# Patient Record
Sex: MALE | Race: AMERICAN INDIAN OR ALASKA NATIVE
[De-identification: names, ages, dates, MRNs, and addresses within clinical notes are randomized per-mention and may not be internally consistent; named-entity substitution may affect disease eponyms.]

---

## 2019-03-25 ENCOUNTER — HOSPITAL ENCOUNTER (INPATIENT)
Dept: HOSPITAL 31 - C.ER | Age: 64
LOS: 1 days | DRG: 296 | End: 2019-03-26
Attending: INTERNAL MEDICINE | Admitting: INTERNAL MEDICINE
Payer: MEDICARE

## 2019-03-25 VITALS — BODY MASS INDEX: 30.4 KG/M2

## 2019-03-25 DIAGNOSIS — Z87.891: ICD-10-CM

## 2019-03-25 DIAGNOSIS — E11.21: ICD-10-CM

## 2019-03-25 DIAGNOSIS — Z51.5: ICD-10-CM

## 2019-03-25 DIAGNOSIS — D69.6: ICD-10-CM

## 2019-03-25 DIAGNOSIS — Z66: ICD-10-CM

## 2019-03-25 DIAGNOSIS — I13.11: ICD-10-CM

## 2019-03-25 DIAGNOSIS — E87.4: ICD-10-CM

## 2019-03-25 DIAGNOSIS — I49.01: ICD-10-CM

## 2019-03-25 DIAGNOSIS — E11.22: ICD-10-CM

## 2019-03-25 DIAGNOSIS — I46.9: Primary | ICD-10-CM

## 2019-03-25 DIAGNOSIS — J96.90: ICD-10-CM

## 2019-03-25 DIAGNOSIS — Z95.5: ICD-10-CM

## 2019-03-25 DIAGNOSIS — F10.20: ICD-10-CM

## 2019-03-25 DIAGNOSIS — N18.6: ICD-10-CM

## 2019-03-25 DIAGNOSIS — I25.10: ICD-10-CM

## 2019-03-25 DIAGNOSIS — D63.8: ICD-10-CM

## 2019-03-25 DIAGNOSIS — Z21: ICD-10-CM

## 2019-03-25 DIAGNOSIS — Z99.2: ICD-10-CM

## 2019-03-25 LAB
ALBUMIN SERPL-MCNC: 3.9 {NULL, G/DL} (ref 3.5–5)
ALBUMIN SERPL-MCNC: 4.5 {NULL, G/DL} (ref 3.5–5)
ALBUMIN/GLOB SERPL: 1.5 {NULL, NULL} (ref 1–2.1)
ALBUMIN/GLOB SERPL: 1.6 {NULL, NULL} (ref 1–2.1)
ALT SERPL-CCNC: 335 {NULL, U/L} (ref 21–72)
ALT SERPL-CCNC: 446 {NULL, U/L} (ref 21–72)
ANISOCYTOSIS BLD QL SMEAR: SLIGHT {NULL, NULL}
APTT BLD: 40 {NULL, SECONDS} (ref 21–34)
APTT BLD: 45 {NULL, SECONDS} (ref 21–34)
ARTERIAL BLOOD GAS O2 SAT: 100.6 {NULL, %} (ref 95–98)
ARTERIAL BLOOD GAS O2 SAT: 99.5 {NULL, %} (ref 95–98)
ARTERIAL BLOOD GAS PCO2: 23 {NULL, MM/HG} (ref 35–45)
ARTERIAL BLOOD GAS PCO2: 43 {NULL, MM/HG} (ref 35–45)
ARTERIAL BLOOD GAS TCO2: 17 {NULL, MMOL/L} (ref 22–28)
ARTERIAL BLOOD GAS TCO2: 6.8 {NULL, MMOL/L} (ref 22–28)
ARTERIAL PATENCY WRIST A: (no result) {NULL, NULL}
AST SERPL-CCNC: 266 {NULL, U/L} (ref 17–59)
AST SERPL-CCNC: 527 {NULL, U/L} (ref 17–59)
BACTERIA #/AREA URNS HPF: (no result) {NULL, NULL}
BASOPHILS # BLD AUTO: 0 {NULL, K/UL} (ref 0–0.2)
BASOPHILS # BLD AUTO: 0 {NULL, K/UL} (ref 0–0.2)
BASOPHILS # BLD AUTO: 0.1 {NULL, K/UL} (ref 0–0.2)
BASOPHILS NFR BLD: 0.1 {NULL, %} (ref 0–2)
BASOPHILS NFR BLD: 0.3 {NULL, %} (ref 0–2)
BASOPHILS NFR BLD: 0.4 {NULL, %} (ref 0–2)
BILIRUB UR-MCNC: NEGATIVE {NULL, NULL}
BUN SERPL-MCNC: 13 {NULL, MG/DL} (ref 9–20)
BUN SERPL-MCNC: 18 {NULL, MG/DL} (ref 9–20)
BUN SERPL-MCNC: 21 {NULL, MG/DL} (ref 9–20)
CALCIUM SERPL-MCNC: 9.1 {NULL, MG/DL} (ref 8.6–10.4)
CALCIUM SERPL-MCNC: 9.4 {NULL, MG/DL} (ref 8.6–10.4)
CALCIUM SERPL-MCNC: 9.6 {NULL, MG/DL} (ref 8.6–10.4)
CK MB SERPL-MCNC: 103 {NULL, NG/ML} (ref 0–3.38)
CK MB SERPL-MCNC: 168 {NULL, NG/ML} (ref 0–3.38)
CK MB SERPL-MCNC: 5.15 {NULL, NG/ML} (ref 0–3.38)
EOSINOPHIL # BLD AUTO: 0 {NULL, K/UL} (ref 0–0.7)
EOSINOPHIL # BLD AUTO: 0 {NULL, K/UL} (ref 0–0.7)
EOSINOPHIL # BLD AUTO: 0.4 {NULL, K/UL} (ref 0–0.7)
EOSINOPHIL NFR BLD: 0.1 {NULL, %} (ref 0–4)
EOSINOPHIL NFR BLD: 0.2 {NULL, %} (ref 0–4)
EOSINOPHIL NFR BLD: 1.5 {NULL, %} (ref 0–4)
ERYTHROCYTE [DISTWIDTH] IN BLOOD BY AUTOMATED COUNT: 16.7 {NULL, %} (ref 11.5–14.5)
ERYTHROCYTE [DISTWIDTH] IN BLOOD BY AUTOMATED COUNT: 17 {NULL, %} (ref 11.5–14.5)
ERYTHROCYTE [DISTWIDTH] IN BLOOD BY AUTOMATED COUNT: 17 {NULL, %} (ref 11.5–14.5)
GFR NON-AFRICAN AMERICAN: 6 {NULL, NULL}
GFR NON-AFRICAN AMERICAN: 7 {NULL, NULL}
GFR NON-AFRICAN AMERICAN: 9 {NULL, NULL}
GLUCOSE UR STRIP-MCNC: NORMAL {NULL, MG/DL}
HCO3 BLDA-SCNC: 15.3 {NULL, MMOL/L} (ref 21–28)
HCO3 BLDA-SCNC: 6.8 {NULL, MMOL/L} (ref 21–28)
HGB BLD-MCNC: 10.1 {NULL, G/DL} (ref 12–18)
HGB BLD-MCNC: 9.5 {NULL, G/DL} (ref 12–18)
HGB BLD-MCNC: 9.5 {NULL, G/DL} (ref 12–18)
HYPOCHROMIC: SLIGHT {NULL, NULL}
INHALED O2 CONCENTRATION: 100 {NULL, %}
INHALED O2 CONCENTRATION: 100 {NULL, %}
INR PPP: 1.1 {NULL, NULL}
INR PPP: 1.3 {NULL, NULL}
LEUKOCYTE ESTERASE UR-ACNC: (no result) {NULL, LEU/UL}
LYMPHOCYTE: 7 {NULL, %} (ref 20–40)
LYMPHOCYTES # BLD AUTO: 0.8 {NULL, K/UL} (ref 1–4.3)
LYMPHOCYTES # BLD AUTO: 1.1 {NULL, K/UL} (ref 1–4.3)
LYMPHOCYTES # BLD AUTO: 10.1 {NULL, K/UL} (ref 1–4.3)
LYMPHOCYTES NFR BLD AUTO: 3.8 {NULL, %} (ref 20–40)
LYMPHOCYTES NFR BLD AUTO: 43.3 {NULL, %} (ref 20–40)
LYMPHOCYTES NFR BLD AUTO: 7.4 {NULL, %} (ref 20–40)
MCH RBC QN AUTO: 27.5 {NULL, PG} (ref 27–31)
MCH RBC QN AUTO: 28.1 {NULL, PG} (ref 27–31)
MCH RBC QN AUTO: 28.2 {NULL, PG} (ref 27–31)
MCHC RBC AUTO-ENTMCNC: 29.4 {NULL, G/DL} (ref 33–37)
MCHC RBC AUTO-ENTMCNC: 30.6 {NULL, G/DL} (ref 33–37)
MCHC RBC AUTO-ENTMCNC: 31.5 {NULL, G/DL} (ref 33–37)
MCV RBC AUTO: 89.3 {NULL, FL} (ref 80–94)
MCV RBC AUTO: 92.4 {NULL, FL} (ref 80–94)
MCV RBC AUTO: 93.8 {NULL, FL} (ref 80–94)
MONOCYTE: 3 {NULL, %} (ref 0–10)
MONOCYTES # BLD: 1 {NULL, K/UL} (ref 0–0.8)
MONOCYTES # BLD: 1.1 {NULL, K/UL} (ref 0–0.8)
MONOCYTES # BLD: 1.2 {NULL, K/UL} (ref 0–0.8)
MONOCYTES NFR BLD: 4.8 {NULL, %} (ref 0–10)
MONOCYTES NFR BLD: 4.9 {NULL, %} (ref 0–10)
MONOCYTES NFR BLD: 7.7 {NULL, %} (ref 0–10)
NEUTROPHILS # BLD: 11.6 {NULL, K/UL} (ref 1.8–7)
NEUTROPHILS # BLD: 12.9 {NULL, K/UL} (ref 1.8–7)
NEUTROPHILS # BLD: 18.9 {NULL, K/UL} (ref 1.8–7)
NEUTROPHILS NFR BLD AUTO: 49.9 {NULL, %} (ref 50–75)
NEUTROPHILS NFR BLD AUTO: 84.4 {NULL, %} (ref 50–75)
NEUTROPHILS NFR BLD AUTO: 88 {NULL, %} (ref 50–75)
NEUTROPHILS NFR BLD AUTO: 91.2 {NULL, %} (ref 50–75)
NEUTS BAND NFR BLD: 2 {NULL, %} (ref 0–2)
NRBC BLD AUTO-RTO: 0.3 {NULL, %} (ref 0–2)
OVALOCYTES BLD QL SMEAR: SLIGHT {NULL, NULL}
PH BLDA: 7.03 {NULL, NULL} (ref 7.35–7.45)
PH BLDA: 7.17 {NULL, NULL} (ref 7.35–7.45)
PH UR STRIP: 5 {NULL, NULL} (ref 5–8)
PLATELET # BLD EST: NORMAL {NULL, NULL}
PLATELET # BLD: 178 {NULL, K/UL} (ref 130–400)
PLATELET # BLD: 181 {NULL, K/UL} (ref 130–400)
PLATELET # BLD: 99 {NULL, K/UL} (ref 130–400)
PMV BLD AUTO: 8.3 {NULL, FL} (ref 7.2–11.7)
PMV BLD AUTO: 8.8 {NULL, FL} (ref 7.2–11.7)
PMV BLD AUTO: 9.2 {NULL, FL} (ref 7.2–11.7)
PO2 BLDA: 267 {NULL, MM/HG} (ref 80–100)
PO2 BLDA: 285 {NULL, MM/HG} (ref 80–100)
POLYCHROMIC: SLIGHT {NULL, NULL}
PROT UR STRIP-MCNC: (no result) {NULL, MG/DL}
PROTHROMBIN TIME: 12.5 {NULL, SECONDS} (ref 9.7–12.2)
PROTHROMBIN TIME: 13.7 {NULL, SECONDS} (ref 9.7–12.2)
RBC # BLD AUTO: 3.36 {NULL, MIL/UL} (ref 4.4–5.9)
RBC # BLD AUTO: 3.46 {NULL, MIL/UL} (ref 4.4–5.9)
RBC # BLD AUTO: 3.59 {NULL, MIL/UL} (ref 4.4–5.9)
RBC # UR STRIP: NEGATIVE {NULL, NULL}
SP GR UR STRIP: 1.02 {NULL, NULL} (ref 1–1.03)
SQUAMOUS EPITHIAL: < 1 {NULL, /HPF} (ref 0–5)
TOTAL CELLS COUNTED BLD: 100 {NULL, NULL}
TROPONIN I SERPL-MCNC: 0.22 {NULL, NG/ML} (ref 0–0.12)
TROPONIN I SERPL-MCNC: 107 {NULL, NG/ML} (ref 0–0.12)
TROPONIN I SERPL-MCNC: 61.5 {NULL, NG/ML} (ref 0–0.12)
UROBILINOGEN UR-MCNC: NORMAL {NULL, MG/DL} (ref 0.2–1)
WBC # BLD AUTO: 15.2 {NULL, K/UL} (ref 4.8–10.8)
WBC # BLD AUTO: 20.8 {NULL, K/UL} (ref 4.8–10.8)
WBC # BLD AUTO: 23.3 {NULL, K/UL} (ref 4.8–10.8)

## 2019-03-25 PROCEDURE — 5A1945Z RESPIRATORY VENTILATION, 24-96 CONSECUTIVE HOURS: ICD-10-PCS | Performed by: INTERNAL MEDICINE

## 2019-03-25 PROCEDURE — 0BH17EZ INSERTION OF ENDOTRACHEAL AIRWAY INTO TRACHEA, VIA NATURAL OR ARTIFICIAL OPENING: ICD-10-PCS | Performed by: INTERNAL MEDICINE

## 2019-03-25 PROCEDURE — 5A12012 PERFORMANCE OF CARDIAC OUTPUT, SINGLE, MANUAL: ICD-10-PCS | Performed by: INTERNAL MEDICINE

## 2019-03-25 RX ADMIN — IPRATROPIUM BROMIDE AND ALBUTEROL SULFATE SCH ML: .5; 3 SOLUTION RESPIRATORY (INHALATION) at 19:10

## 2019-03-25 RX ADMIN — DOPAMINE HYDROCHLORIDE IN DEXTROSE PRN MLS/HR: 1.6 INJECTION, SOLUTION INTRAVENOUS at 22:16

## 2019-03-25 RX ADMIN — IPRATROPIUM BROMIDE AND ALBUTEROL SULFATE SCH ML: .5; 3 SOLUTION RESPIRATORY (INHALATION) at 15:40

## 2019-03-25 RX ADMIN — HUMAN INSULIN SCH UNIT: 100 INJECTION, SOLUTION SUBCUTANEOUS at 23:23

## 2019-03-25 RX ADMIN — HUMAN INSULIN SCH UNIT: 100 INJECTION, SOLUTION SUBCUTANEOUS at 22:25

## 2019-03-25 RX ADMIN — DOPAMINE HYDROCHLORIDE IN DEXTROSE PRN MLS/HR: 1.6 INJECTION, SOLUTION INTRAVENOUS at 16:59

## 2019-03-25 NOTE — C.PDOC
History Of Present Illness





Patient BIBA from dialysis s/p cardiac arrest (completed 3 hrs).  As per EMS, 

patient was eating a protein bar and then arrested (? choking episode).  He was 

given epi x6 + CaCl in the field, and shocked once (ventricular fibrillation).  

Arrives to ED intubated, with CPR in progress.


Time Seen by Provider: 03/25/19 10:21


Chief Complaint (Nursing): Cardiac Arrest


History Per: EMS


Reason For Code Blue: Full Arrest


Circumstances: Brought To ED By EMS


Arrest Witnessed By: Nurse


Down-Time Before ACLS: Mins (60 minutes)


Treatment Initiated Prior To MD Arrival: Yes: CPR, BVM Ventilations, Intubation,

Defibrillation, IVF, ACLS Medication Initiation, IV Access





- Initial Findings


Mentation: Unresponsive


Respirations: None (Assisted)





Past Medical History


Reviewed: Historical Data, Nursing Documentation, Vital Signs





- Medical History


PMH: No Chronic Diseases


Family History: States: No Known Family Hx





Review Of Systems


Review Of Systems: ROS cannot be obtained secondary to pt's inabilty to answer 

questions.





Physical Exam





- Physical Exam


Appears: Other (unresponsive )


Head: Atraumatic, Normacephalic


Eye(s): bilateral: Other (fixed  B/L )


Oral Mucosa: Moist


Cardiovascular: Other (pulseless)


Respiratory: Other (assisted ventilations, equal breath sounds B/L )


Gastrointestinal/Abdominal: Normal Exam, Bowel Sounds, Soft, No Tenderness, 

Other (obese)





ED Course And Treatment





- Laboratory Results


Result Diagrams: 


                                 03/25/19 11:04





                                 03/25/19 11:04


O2 Sat by Pulse Oximetry: 100 (ambu)


Pulse Ox Interpretation: Normal


Progress Note: Epi x1 given, patientregained pulse at next pulse check - atrial 

fibrillation on EKG.  Approx ten minutes later, patient become bradycardic and 

then lost pulse again.  Epi x1 given and patient regained pulse.  IO right tibia

inserted by me, (+) good flushing.  Blood drawn from right brachial artery by 

me.  Patient lost pulse again - epi x1 and CPR started.  At next rhythm check 

(+) good pulse with organized rhythm.  Spoke with intensivist Dr. Clark, 

will come down and see patient.  Patient's  in ED, made aware of jennifer lenz's condition and multiple arrests.  He wants everything done for patient.





Central Line Placement





- Central Line Placement


Indication: Emergent IV Access


Central Line Placement: Left: Femoral


The Area Was Thoroughly Prepared With: Chlorhexidine


Procedure: Triple Lumen, Placed Using Standard Seldinger Technique, Catheter Was

Sewn Into Place, Sterile Dressing Placed Over Line, Procedure Tolerated Well





Disposition





- Disposition


Forms:  [facility] (English)

## 2019-03-25 NOTE — CP.PCM.PN
Subjective





- Date & Time of Evaluation


Date of Evaluation: 03/25/19


Time of Evaluation: 20:10





- Subjective


Subjective: 


House doctor Code Blue note





Objective





- Vital Signs/Intake and Output


Vital Signs (last 24 hours): 


                                        











Temp Pulse Resp BP Pulse Ox


 


 92 F L  85   14   134/88   100 


 


 03/25/19 21:00  03/25/19 21:04  03/25/19 21:04  03/25/19 21:22  03/25/19 21:04








Intake and Output: 


                                        











 03/25/19 03/26/19





 18:59 06:59


 


Intake Total 1177.8 644


 


Output Total 106 


 


Balance 1071.8 644














- Medications


Medications: 


                               Current Medications





Albuterol/Ipratropium (Duoneb 3 Mg/0.5 Mg (3 Ml) Ud)  3 ml INH RQ4 TANIKA


   Last Admin: 03/25/19 19:10 Dose:  3 ml


Calcium Acetate (Phoslo)  667 mg PO DAILY [facility]


Clopidogrel Bisulfate (Plavix)  75 mg PO DAILY [facility]


Ezetimibe (Zetia)  10 mg PO HS [facility]


Famotidine (Pepcid)  20 mg PO DAILY [facility]


Gabapentin (Neurontin)  100 mg PO HS [facility]


Heparin Sodium (Porcine) (Heparin)  5,000 units SC Q8 [facility]


Home Med (Patient's Own Medication)  1 tab PO DAILY [facility]


Norepinephrine Bitartrate 4 mg (/ Sodium Chloride)  254 mls @ 15.24 mls/hr IV 

[id number] PRN; Protocol


   PRN Reason: TITRATE PER MD ORDER


   Last Admin: 03/25/19 21:22 Dose:  15 mcg/min, 57.15 mls/hr


Dopamine HCl/Dextrose (Dopamine 400mg/250ml D5w)  400 mg in 250 mls @ 7.032 

mls/hr IV .Q24H PRN; Protocol


   PRN Reason: TITRATE PER MD ORDER


   Last Titration: 03/25/19 21:12 Dose:  15 mcg/kg/min, 52.739 mls/hr


Meperidine HCl (Demerol)  25 mg IVP Q30M PRN


   PRN Reason: Rigors


Potassium Chloride (Potassium Chloride Oral Soln)  20 meq PO DAILY [facility]


Raltegravir (Isentress)  400 mg PO Q12H [facility]; Protocol











- Labs


Labs: 


                                        





                                 03/25/19 14:44 





                                 03/25/19 14:44 





                                        











PT  12.5 SECONDS (9.7-12.2)  H  03/25/19  11:04    


 


INR  1.1   03/25/19  11:04    


 


APTT  45 SECONDS (21-34)  H  03/25/19  11:04

## 2019-03-25 NOTE — RAD
Date of service: 



03/25/2019



PROCEDURE:  CHEST RADIOGRAPH, 1 VIEW



HISTORY:

chest pain



COMPARISON:

None available.



FINDINGS:

Endotracheal tube terminates 2.3 cm proximal to the wesley.  The 

nasogastric tube terminates in the stomach. 



LUNGS:





The lungs are well inflated.  There is mild pulmonary venous 

congestion with redistribution 



PLEURA:

No pneumothorax or pleural effusion.



CARDIOVASCULAR:

Severe cardiomegaly.  There are aortic atherosclerotic calcifications 

present. 



OSSEOUS STRUCTURES:

Within normal limits for the patient's age.



VISUALIZED UPPER ABDOMEN:

Normal.



OTHER FINDINGS:

None. 



IMPRESSION:

Endotracheal tube terminates 2.3 cm proximal to the wesley.  

Nasogastric tube terminates in the stomach.



Severe cardiomegaly and mild pulmonary venous congestion with 

redistribution.

## 2019-03-25 NOTE — CP.CCUPN
CCU Subjective





- Physician Review


Events Since Last Encounter (Free Text): 





03/25/19 20:46


patient became bradycardic atropine given,then pulseless


CPR performed,one dose epinephrine and bicarbonate given.Regained pulse 


03/25/19 20:48





03/25/19 20:48








CCU Objective





- Vital Signs / Intake & Output


Vital Signs (Last 4 hours): 


Vital Signs











  Temp Pulse Resp BP Pulse Ox


 


 03/25/19 20:14   84  18  87/62 L 


 


 03/25/19 20:00   48 L  20   99


 


 03/25/19 19:34   71  20  119/82  54 L


 


 03/25/19 19:17   67   


 


 03/25/19 19:03   69  14  100/64  100


 


 03/25/19 19:00  91.8 F L  70  15  93/66 L  100


 


 03/25/19 18:33   68  13  81/60 L  100


 


 03/25/19 18:30  92.8 F L  68  24  


 


 03/25/19 18:21  94.6 F L  67  14  86/57 L  100


 


 03/25/19 18:10     95/62 L 


 


 03/25/19 18:04   67  15  95/62 L  100


 


 03/25/19 18:00  94.6 F L  68  24  95/62 L 


 


 03/25/19 17:34   73  15  106/70  88 L


 


 03/25/19 17:30  94.6 F L  71  16  106/70 


 


 03/25/19 17:04   107 H  14  150/87  100


 


 03/25/19 17:00  96.4 F L  108 H  13  150/87 


 


 03/25/19 16:59     47/32 L 


 


 03/25/19 16:56   142 H  17  107/74  95


 


 03/25/19 16:53   69  20  52/28 L  100











Intake and Output (Last 8hrs): 


                                 Intake & Output











 03/25/19 03/25/19 03/25/19





 06:59 14:59 22:59


 


Intake Total  235 1279.8


 


Output Total   106


 


Balance  235 1173.8


 


Weight  206 lb 11.2 oz 


 


Intake:   


 


  IV  235 236


 


  Intake, IV Amount   1043.8


 


    Left Distal Port Femoral   50


 


    Left Medial Port Femoral   191.3


 


    Left Proximal Port   802.5





    Femoral   


 


Output:   


 


  Urine   106


 


    Urethral (Handy)   51


 


Other:   


 


  Voiding Method  Indwelling Catheter 














- Physical Exam


Physical Exam Limitations: Positive for: Other (orally intubated,unresponsive to

pain)


Head: Positive for: Atraumatic, Normocephalic


Pupils: Positive for: Non-Reactive


Mouth: Positive for: Moist Mucous Membranes


Respiratory/Chest: Positive for: Clear to Auscultation


Cardiovascular: Positive for: Regular Rate and Rhythm





- Medications


Active Medications: 


Active Medications











Generic Name Dose Route Start Last Admin





  Trade Name Freq  PRN Reason Stop Dose Admin


 


Albuterol/Ipratropium  3 ml  03/25/19 16:00  03/25/19 19:10





  Duoneb 3 Mg/0.5 Mg (3 Ml) Ud  INH   3 ml





  RQ4 [facility]   Administration





     





     





     





     


 


Calcium Acetate  667 mg  03/26/19 10:00  





  Phoslo  PO   





  DAILY [facility]   





     





     





     





     


 


Clopidogrel Bisulfate  75 mg  03/26/19 10:00  





  Plavix  PO   





  DAILY [facility]   





     





     





     





     


 


Ezetimibe  10 mg  03/25/19 22:00  





  Zetia  PO   





  HS [facility]   





     





     





     





     


 


Famotidine  20 mg  03/26/19 10:00  





  Pepcid  PO   





  DAILY [facility]   





     





     





     





     


 


Gabapentin  100 mg  03/25/19 22:00  





  Neurontin  PO   





  HS [facility]   





     





     





     





     


 


Heparin Sodium (Porcine)  5,000 units  03/25/19 22:00  





  Heparin  SC   





  Q8 [facility]   





     





     





     





     


 


Home Med  1 tab  03/26/19 10:00  





  Patient's Own Medication  PO   





  DAILY [facility]   





     





     





     





     


 


Norepinephrine Bitartrate 4 mg  254 mls @ 15.24 mls/hr  03/25/19 16:42  03/25/19

20:28





  / Sodium Chloride  IV   20 mcg/min





  .[id number] PRN   76.2 mls/hr





  TITRATE PER MD ORDER   Titration





     





  Protocol   





  4 MCG/MIN   


 


Dopamine HCl/Dextrose  400 mg in 250 mls @ 7.032 mls/hr  03/25/19 17:05  

03/25/19 20:27





  Dopamine 400mg/250ml D5w  IV   20 mcg/kg/min





  .Q24H PRN   70.319 mls/hr





  TITRATE PER MD ORDER   Titration





     





  Protocol   





  2 MCG/KG/MIN   


 


Meperidine HCl  25 mg  03/25/19 15:06  





  Demerol  IVP   





  Q30M PRN   





  [place]   





     





     





     


 


Potassium Chloride  20 meq  03/26/19 10:00  





  Potassium Chloride Oral Soln  PO   





  DAILY [facility]   





     





     





     





     


 


Raltegravir  400 mg  03/25/19 22:00  





  [place]  PO   





  Q12H [facility]   





     





     





  Protocol   





     














- Patient Studies


Lab Studies: 


                                   Lab Studies











  03/25/19 03/25/19 03/25/19 Range/Units





  19:30 16:40 14:44 


 


WBC     (4.8-10.8)  K/uL


 


RBC     (4.40-5.90)  Mil/uL


 


Hgb     (12.0-18.0)  g/dL


 


Hct     (35.0-51.0)  %


 


MCV     (80.0-94.0)  fL


 


MCH     (27.0-31.0)  pg


 


MCHC     (33.0-37.0)  g/dL


 


RDW     (11.5-14.5)  %


 


Plt Count     (130-400)  K/uL


 


MPV     (7.2-11.7)  fL


 


Neut % (Auto)     (50.0-75.0)  %


 


Lymph % (Auto)     (20.0-40.0)  %


 


Mono % (Auto)     (0.0-10.0)  %


 


Eos % (Auto)     (0.0-4.0)  %


 


Baso % (Auto)     (0.0-2.0)  %


 


Neut # (Auto)     (1.8-7.0)  K/uL


 


Lymph # (Auto)     (1.0-4.3)  K/uL


 


Mono # (Auto)     (0.0-0.8)  K/uL


 


Eos # (Auto)     (0.0-0.7)  K/uL


 


Baso # (Auto)     (0.0-0.2)  K/uL


 


Neutrophils % (Manual)     (50-75)  %


 


Band Neutrophils %     (0-2)  %


 


Lymphocytes % (Manual)     (20-40)  %


 


Monocytes % (Manual)     (0-10)  %


 


Differential Comment     


 


Platelet Estimate     (NORMAL)  


 


Polychromasia     


 


Hypochromasia (manual)     


 


Anisocytosis (manual)     


 


Ovalocytes     


 


PT     (9.7-12.2)  SECONDS


 


INR     


 


APTT     (21-34)  SECONDS


 


Puncture Site  Rba    


 


pCO2  43    (35-45)  mm/Hg


 


pO2  285 H    ()  mm/Hg


 


HCO3  15.3 L    (21-28)  mmol/L


 


ABG pH  7.17 L*    (7.35-7.45)  


 


ABG Total CO2  17.0 L    (22-28)  mmol/L


 


ABG O2 Saturation  99.5 H    (95-98)  %


 


ABG Base Excess  -12.4 L    (-2.0-3.0)  mmol/L


 


John Test  Pos    


 


ABG Potassium  3.7    (3.6-5.2)  mmol/L


 


A-a O2 Difference  374.0    mm/Hg


 


Respiratory Index  1.3    


 


Glucose  247 H    ()  mg/dl


 


Lactate  15.6 H*    (0.7-2.1)  mmol/L


 


Vent Mode  Prvc    


 


Mechanical Rate  20    


 


FiO2  100.0    %


 


Tidal Volume  500    


 


PEEP  5    


 


Crit Value Called To  Dr quan    


 


Crit Value Called By  [facility]    


 


Crit Value Read Back  Y    


 


Blood Gas Notified Time  1934    


 


Sodium  142.0    (132-148)  mmol/L


 


Potassium     (3.6-5.2)  mmol/L


 


Chloride  100.0    ()  mmol/L


 


Carbon Dioxide     (22-30)  mmol/L


 


Anion Gap     (10-20)  


 


BUN     (9-20)  mg/dL


 


Creatinine     (0.8-1.5)  mg/dL


 


Est GFR (African Amer)     


 


Est GFR (Non-Af Amer)     


 


Random Glucose     ()  mg/dL


 


Calcium     (8.6-10.4)  mg/dl


 


Phosphorus     (2.5-4.5)  mg/dL


 


Magnesium     (1.6-2.3)  mg/dL


 


Total Bilirubin     (0.2-1.3)  mg/dL


 


AST     (17-59)  U/L


 


ALT     (21-72)  U/L


 


Alkaline Phosphatase     ()  U/L


 


Total Creatine Kinase   3962 H   ()  U/L


 


CK-MB (Mass)   103 H   (0.0-3.38)  ng/mL


 


Troponin I   61.5000 H*   (0.00-0.120)  ng/mL


 


NT-Pro-B Natriuret Pep     (0-900)  pg/mL


 


Total Protein     (6.3-8.3)  g/dL


 


Albumin     (3.5-5.0)  g/dL


 


Globulin     (2.2-3.9)  gm/dL


 


Albumin/Globulin Ratio     (1.0-2.1)  


 


Arterial Blood Potassium  3.7    (3.6-5.2)  mmol/L


 


Urine Color    Yellow  (YELLOW)  


 


Urine Clarity    Hazy  (Clear)  


 


Urine pH    5.0  (5.0-8.0)  


 


Ur Specific Gravity    1.023  (1.003-1.030)  


 


Urine Protein    1+ H  (NEGATIVE)  mg/dL


 


Urine Glucose (UA)    Normal  (Normal)  mg/dL


 


Urine Ketones    Negative  (NEGATIVE)  mg/dL


 


Urine Blood    Negative  (NEGATIVE)  


 


Urine Nitrate    Negative  (NEGATIVE)  


 


Urine Bilirubin    Negative  (NEGATIVE)  


 


Urine Urobilinogen    Normal  (0.2-1.0)  mg/dL


 


Ur Leukocyte Esterase    Neg  (Negative)  Caesar/uL


 


Urine WBC (Auto)    2  (0-5)  /hpf


 


Urine RBC (Auto)    3  (0-3)  /hpf


 


Ur Squamous Epith Cells    < 1  (0-5)  /hpf


 


Urine Bacteria    Rare  (<OCC)  


 


Blood Type     


 


Antibody Screen     














  03/25/19 03/25/19 03/25/19 Range/Units





  14:44 14:44 11:50 


 


WBC   15.2 H   (4.8-10.8)  K/uL


 


RBC   3.59 L   (4.40-5.90)  Mil/uL


 


Hgb   10.1 L   (12.0-18.0)  g/dL


 


Hct   32.1 L   (35.0-51.0)  %


 


MCV   89.3  D   (80.0-94.0)  fL


 


MCH   28.1   (27.0-31.0)  pg


 


MCHC   31.5 L   (33.0-37.0)  g/dL


 


RDW   17.0 H   (11.5-14.5)  %


 


Plt Count   178   (130-400)  K/uL


 


MPV   8.8   (7.2-11.7)  fL


 


Neut % (Auto)   84.4 H   (50.0-75.0)  %


 


Lymph % (Auto)   7.4 L   (20.0-40.0)  %


 


Mono % (Auto)   7.7   (0.0-10.0)  %


 


Eos % (Auto)   0.2   (0.0-4.0)  %


 


Baso % (Auto)   0.3   (0.0-2.0)  %


 


Neut # (Auto)   12.9 H   (1.8-7.0)  K/uL


 


Lymph # (Auto)   1.1   (1.0-4.3)  K/uL


 


Mono # (Auto)   1.2 H   (0.0-0.8)  K/uL


 


Eos # (Auto)   0.0   (0.0-0.7)  K/uL


 


Baso # (Auto)   0.0   (0.0-0.2)  K/uL


 


Neutrophils % (Manual)   88 H   (50-75)  %


 


Band Neutrophils %   2   (0-2)  %


 


Lymphocytes % (Manual)   7 L   (20-40)  %


 


Monocytes % (Manual)   3   (0-10)  %


 


Differential Comment     


 


Platelet Estimate   Normal   (NORMAL)  


 


Polychromasia   Slight   


 


Hypochromasia (manual)   Slight   


 


Anisocytosis (manual)   Slight   


 


Ovalocytes   Slight   


 


PT     (9.7-12.2)  SECONDS


 


INR     


 


APTT     (21-34)  SECONDS


 


Puncture Site    Rb  


 


pCO2    23 L  (35-45)  mm/Hg


 


pO2    267 H  ()  mm/Hg


 


HCO3    6.8 L*  (21-28)  mmol/L


 


ABG pH    7.03 L*  (7.35-7.45)  


 


ABG Total CO2    6.8 L  (22-28)  mmol/L


 


ABG O2 Saturation    100.6 H  (95-98)  %


 


ABG Base Excess    -23.3 L  (-2.0-3.0)  mmol/L


 


Ojhn Test    Na  


 


ABG Potassium    1.2 L*  (3.6-5.2)  mmol/L


 


A-a O2 Difference    417.0  mm/Hg


 


Respiratory Index    1.6  


 


Glucose    111 H  ()  mg/dl


 


Lactate    6.0 H*  (0.7-2.1)  mmol/L


 


Vent Mode    Prvc  


 


Mechanical Rate    16  


 


FiO2    100.0  %


 


Tidal Volume    500  


 


PEEP    5  


 


Crit Value Called To    T ren do  


 


Crit Value Called By    MOHINI lester rrt  


 


Crit Value Read Back    Y  


 


Blood Gas Notified Time    1200  


 


Sodium  138   158.0 H  (132-148)  mmol/L


 


Potassium  3.5 L    (3.6-5.2)  mmol/L


 


Chloride  92 L   131.0 H  ()  mmol/L


 


Carbon Dioxide  25    (22-30)  mmol/L


 


Anion Gap  24 H    (10-20)  


 


BUN  18    (9-20)  mg/dL


 


Creatinine  7.9 H*    (0.8-1.5)  mg/dL


 


Est GFR ( Amer)  8    


 


Est GFR (Non-Af Amer)  7    


 


Random Glucose  282 H    ()  mg/dL


 


Calcium  9.4    (8.6-10.4)  mg/dl


 


Phosphorus  4.0    (2.5-4.5)  mg/dL


 


Magnesium  2.4 H    (1.6-2.3)  mg/dL


 


Total Bilirubin  0.7    (0.2-1.3)  mg/dL


 


AST  527 H D    (17-59)  U/L


 


ALT  446 H D    (21-72)  U/L


 


Alkaline Phosphatase  74    ()  U/L


 


Total Creatine Kinase     ()  U/L


 


CK-MB (Mass)     (0.0-3.38)  ng/mL


 


Troponin I     (0.00-0.120)  ng/mL


 


NT-Pro-B Natriuret Pep     (0-900)  pg/mL


 


Total Protein  7.5    (6.3-8.3)  g/dL


 


Albumin  4.5    (3.5-5.0)  g/dL


 


Globulin  3.0    (2.2-3.9)  gm/dL


 


Albumin/Globulin Ratio  1.5    (1.0-2.1)  


 


Arterial Blood Potassium    1.2 L*  (3.6-5.2)  mmol/L


 


Urine Color     (YELLOW)  


 


Urine Clarity     (Clear)  


 


Urine pH     (5.0-8.0)  


 


Ur Specific Gravity     (1.003-1.030)  


 


Urine Protein     (NEGATIVE)  mg/dL


 


Urine Glucose (UA)     (Normal)  mg/dL


 


Urine Ketones     (NEGATIVE)  mg/dL


 


Urine Blood     (NEGATIVE)  


 


Urine Nitrate     (NEGATIVE)  


 


Urine Bilirubin     (NEGATIVE)  


 


Urine Urobilinogen     (0.2-1.0)  mg/dL


 


Ur Leukocyte Esterase     (Negative)  Caesar/uL


 


Urine WBC (Auto)     (0-5)  /hpf


 


Urine RBC (Auto)     (0-3)  /hpf


 


Ur Squamous Epith Cells     (0-5)  /hpf


 


Urine Bacteria     (<OCC)  


 


Blood Type     


 


Antibody Screen     














  03/25/19 03/25/19 03/25/19 Range/Units





  11:26 11:04 11:04 


 


WBC     (4.8-10.8)  K/uL


 


RBC     (4.40-5.90)  Mil/uL


 


Hgb     (12.0-18.0)  g/dL


 


Hct     (35.0-51.0)  %


 


MCV     (80.0-94.0)  fL


 


MCH     (27.0-31.0)  pg


 


MCHC     (33.0-37.0)  g/dL


 


RDW     (11.5-14.5)  %


 


Plt Count     (130-400)  K/uL


 


MPV     (7.2-11.7)  fL


 


Neut % (Auto)     (50.0-75.0)  %


 


Lymph % (Auto)     (20.0-40.0)  %


 


Mono % (Auto)     (0.0-10.0)  %


 


Eos % (Auto)     (0.0-4.0)  %


 


Baso % (Auto)     (0.0-2.0)  %


 


Neut # (Auto)     (1.8-7.0)  K/uL


 


Lymph # (Auto)     (1.0-4.3)  K/uL


 


Mono # (Auto)     (0.0-0.8)  K/uL


 


Eos # (Auto)     (0.0-0.7)  K/uL


 


Baso # (Auto)     (0.0-0.2)  K/uL


 


Neutrophils % (Manual)     (50-75)  %


 


Band Neutrophils %     (0-2)  %


 


Lymphocytes % (Manual)     (20-40)  %


 


Monocytes % (Manual)     (0-10)  %


 


Differential Comment     


 


Platelet Estimate     (NORMAL)  


 


Polychromasia     


 


Hypochromasia (manual)     


 


Anisocytosis (manual)     


 


Ovalocytes     


 


PT    12.5 H  (9.7-12.2)  SECONDS


 


INR    1.1  


 


APTT    45 H  (21-34)  SECONDS


 


Puncture Site     


 


pCO2     (35-45)  mm/Hg


 


pO2     ()  mm/Hg


 


HCO3     (21-28)  mmol/L


 


ABG pH     (7.35-7.45)  


 


ABG Total CO2     (22-28)  mmol/L


 


ABG O2 Saturation     (95-98)  %


 


ABG Base Excess     (-2.0-3.0)  mmol/L


 


John Test     


 


ABG Potassium     (3.6-5.2)  mmol/L


 


A-a O2 Difference     mm/Hg


 


Respiratory Index     


 


Glucose     ()  mg/dl


 


Lactate     (0.7-2.1)  mmol/L


 


Vent Mode     


 


Mechanical Rate     


 


FiO2     %


 


Tidal Volume     


 


PEEP     


 


Crit Value Called To     


 


Crit Value Called By     


 


Crit Value Read Back     


 


Blood Gas Notified Time     


 


Sodium   141   (132-148)  mmol/L


 


Potassium   3.1 L   (3.6-5.2)  mmol/L


 


Chloride   95 L   ()  mmol/L


 


Carbon Dioxide   16 L   (22-30)  mmol/L


 


Anion Gap   32 H   (10-20)  


 


BUN   13   (9-20)  mg/dL


 


Creatinine   6.6 H   (0.8-1.5)  mg/dL


 


Est GFR ( Amer)   10   


 


Est GFR (Non-Af Amer)   9   


 


Random Glucose   304 H   ()  mg/dL


 


Calcium   9.6   (8.6-10.4)  mg/dl


 


Phosphorus     (2.5-4.5)  mg/dL


 


Magnesium     (1.6-2.3)  mg/dL


 


Total Bilirubin   0.4   (0.2-1.3)  mg/dL


 


AST   266 H   (17-59)  U/L


 


ALT   335 H   (21-72)  U/L


 


Alkaline Phosphatase   49   ()  U/L


 


Total Creatine Kinase   277 H   ()  U/L


 


CK-MB (Mass)   5.15 H   (0.0-3.38)  ng/mL


 


Troponin I   0.2240 H*   (0.00-0.120)  ng/mL


 


NT-Pro-B Natriuret Pep   54951 H   (0-900)  pg/mL


 


Total Protein   6.4   (6.3-8.3)  g/dL


 


Albumin   3.9   (3.5-5.0)  g/dL


 


Globulin   2.4   (2.2-3.9)  gm/dL


 


Albumin/Globulin Ratio   1.6   (1.0-2.1)  


 


Arterial Blood Potassium     (3.6-5.2)  mmol/L


 


Urine Color     (YELLOW)  


 


Urine Clarity     (Clear)  


 


Urine pH     (5.0-8.0)  


 


Ur Specific Gravity     (1.003-1.030)  


 


Urine Protein     (NEGATIVE)  mg/dL


 


Urine Glucose (UA)     (Normal)  mg/dL


 


Urine Ketones     (NEGATIVE)  mg/dL


 


Urine Blood     (NEGATIVE)  


 


Urine Nitrate     (NEGATIVE)  


 


Urine Bilirubin     (NEGATIVE)  


 


Urine Urobilinogen     (0.2-1.0)  mg/dL


 


Ur Leukocyte Esterase     (Negative)  Caesar/uL


 


Urine WBC (Auto)     (0-5)  /hpf


 


Urine RBC (Auto)     (0-3)  /hpf


 


Ur Squamous Epith Cells     (0-5)  /hpf


 


Urine Bacteria     (<OCC)  


 


Blood Type  A POSITIVE    


 


Antibody Screen  Negative    














  03/25/19 Range/Units





  11:04 


 


WBC  23.3 H  (4.8-10.8)  K/uL


 


RBC  3.46 L  (4.40-5.90)  Mil/uL


 


Hgb  9.5 L  (12.0-18.0)  g/dL


 


Hct  32.5 L  (35.0-51.0)  %


 


MCV  93.8  (80.0-94.0)  fL


 


MCH  27.5  (27.0-31.0)  pg


 


MCHC  29.4 L  (33.0-37.0)  g/dL


 


RDW  16.7 H  (11.5-14.5)  %


 


Plt Count  99 L  (130-400)  K/uL


 


MPV  8.3  (7.2-11.7)  fL


 


Neut % (Auto)  49.9 L  (50.0-75.0)  %


 


Lymph % (Auto)  43.3 H  (20.0-40.0)  %


 


Mono % (Auto)  4.9  (0.0-10.0)  %


 


Eos % (Auto)  1.5  (0.0-4.0)  %


 


Baso % (Auto)  0.4  (0.0-2.0)  %


 


Neut # (Auto)  11.6 H  (1.8-7.0)  K/uL


 


Lymph # (Auto)  10.1 H  (1.0-4.3)  K/uL


 


Mono # (Auto)  1.1 H  (0.0-0.8)  K/uL


 


Eos # (Auto)  0.4  (0.0-0.7)  K/uL


 


Baso # (Auto)  0.1  (0.0-0.2)  K/uL


 


Neutrophils % (Manual)   (50-75)  %


 


Band Neutrophils %   (0-2)  %


 


Lymphocytes % (Manual)   (20-40)  %


 


Monocytes % (Manual)   (0-10)  %


 


Differential Comment    


 


Platelet Estimate   (NORMAL)  


 


Polychromasia   


 


Hypochromasia (manual)   


 


Anisocytosis (manual)   


 


Ovalocytes   


 


PT   (9.7-12.2)  SECONDS


 


INR   


 


APTT   (21-34)  SECONDS


 


Puncture Site   


 


pCO2   (35-45)  mm/Hg


 


pO2   ()  mm/Hg


 


HCO3   (21-28)  mmol/L


 


ABG pH   (7.35-7.45)  


 


ABG Total CO2   (22-28)  mmol/L


 


ABG O2 Saturation   (95-98)  %


 


ABG Base Excess   (-2.0-3.0)  mmol/L


 


John Test   


 


ABG Potassium   (3.6-5.2)  mmol/L


 


A-a O2 Difference   mm/Hg


 


Respiratory Index   


 


Glucose   ()  mg/dl


 


Lactate   (0.7-2.1)  mmol/L


 


Vent Mode   


 


Mechanical Rate   


 


FiO2   %


 


Tidal Volume   


 


PEEP   


 


Crit Value Called To   


 


Crit Value Called By   


 


Crit Value Read Back   


 


Blood Gas Notified Time   


 


Sodium   (132-148)  mmol/L


 


Potassium   (3.6-5.2)  mmol/L


 


Chloride   ()  mmol/L


 


Carbon Dioxide   (22-30)  mmol/L


 


Anion Gap   (10-20)  


 


BUN   (9-20)  mg/dL


 


Creatinine   (0.8-1.5)  mg/dL


 


Est GFR (African Amer)   


 


Est GFR (Non-Af Amer)   


 


Random Glucose   ()  mg/dL


 


Calcium   (8.6-10.4)  mg/dl


 


Phosphorus   (2.5-4.5)  mg/dL


 


Magnesium   (1.6-2.3)  mg/dL


 


Total Bilirubin   (0.2-1.3)  mg/dL


 


AST   (17-59)  U/L


 


ALT   (21-72)  U/L


 


Alkaline Phosphatase   ()  U/L


 


Total Creatine Kinase   ()  U/L


 


CK-MB (Mass)   (0.0-3.38)  ng/mL


 


Troponin I   (0.00-0.120)  ng/mL


 


NT-Pro-B Natriuret Pep   (0-900)  pg/mL


 


Total Protein   (6.3-8.3)  g/dL


 


Albumin   (3.5-5.0)  g/dL


 


Globulin   (2.2-3.9)  gm/dL


 


Albumin/Globulin Ratio   (1.0-2.1)  


 


Arterial Blood Potassium   (3.6-5.2)  mmol/L


 


Urine Color   (YELLOW)  


 


Urine Clarity   (Clear)  


 


Urine pH   (5.0-8.0)  


 


Ur Specific Gravity   (1.003-1.030)  


 


Urine Protein   (NEGATIVE)  mg/dL


 


Urine Glucose (UA)   (Normal)  mg/dL


 


Urine Ketones   (NEGATIVE)  mg/dL


 


Urine Blood   (NEGATIVE)  


 


Urine Nitrate   (NEGATIVE)  


 


Urine Bilirubin   (NEGATIVE)  


 


Urine Urobilinogen   (0.2-1.0)  mg/dL


 


Ur Leukocyte Esterase   (Negative)  Caesar/uL


 


Urine WBC (Auto)   (0-5)  /hpf


 


Urine RBC (Auto)   (0-3)  /hpf


 


Ur Squamous Epith Cells   (0-5)  /hpf


 


Urine Bacteria   (<OCC)  


 


Blood Type   


 


Antibody Screen   








                         Laboratory Results - last 24 hr











  03/25/19 03/25/19 03/25/19





  11:04 11:04 11:04


 


WBC  23.3 H  


 


RBC  3.46 L  


 


Hgb  9.5 L  


 


Hct  32.5 L  


 


MCV  93.8  


 


MCH  27.5  


 


MCHC  29.4 L  


 


RDW  16.7 H  


 


Plt Count  99 L  


 


MPV  8.3  


 


Neut % (Auto)  49.9 L  


 


Lymph % (Auto)  43.3 H  


 


Mono % (Auto)  4.9  


 


Eos % (Auto)  1.5  


 


Baso % (Auto)  0.4  


 


Neut # (Auto)  11.6 H  


 


Lymph # (Auto)  10.1 H  


 


Mono # (Auto)  1.1 H  


 


Eos # (Auto)  0.4  


 


Baso # (Auto)  0.1  


 


Neutrophils % (Manual)   


 


Band Neutrophils %   


 


Lymphocytes % (Manual)   


 


Monocytes % (Manual)   


 


Differential Comment    


 


Platelet Estimate   


 


Polychromasia   


 


Hypochromasia (manual)   


 


Anisocytosis (manual)   


 


Ovalocytes   


 


PT   12.5 H 


 


INR   1.1 


 


APTT   45 H 


 


Puncture Site   


 


pCO2   


 


pO2   


 


HCO3   


 


ABG pH   


 


ABG Total CO2   


 


ABG O2 Saturation   


 


ABG Base Excess   


 


John Test   


 


ABG Potassium   


 


A-a O2 Difference   


 


Respiratory Index   


 


Glucose   


 


Lactate   


 


Vent Mode   


 


Mechanical Rate   


 


FiO2   


 


Tidal Volume   


 


PEEP   


 


Crit Value Called To   


 


Crit Value Called By   


 


Crit Value Read Back   


 


Blood Gas Notified Time   


 


Sodium    141


 


Potassium    3.1 L


 


Chloride    95 L


 


Carbon Dioxide    16 L


 


Anion Gap    32 H


 


BUN    13


 


Creatinine    6.6 H


 


Est GFR ( Amer)    10


 


Est GFR (Non-Af Amer)    9


 


Random Glucose    304 H


 


Calcium    9.6


 


Phosphorus   


 


Magnesium   


 


Total Bilirubin    0.4


 


AST    266 H


 


ALT    335 H


 


Alkaline Phosphatase    49


 


Total Creatine Kinase    277 H


 


CK-MB (Mass)    5.15 H


 


Troponin I    0.2240 H*


 


NT-Pro-B Natriuret Pep    34635 H


 


Total Protein    6.4


 


Albumin    3.9


 


Globulin    2.4


 


Albumin/Globulin Ratio    1.6


 


Arterial Blood Potassium   


 


Urine Color   


 


Urine Clarity   


 


Urine pH   


 


Ur Specific Gravity   


 


Urine Protein   


 


Urine Glucose (UA)   


 


Urine Ketones   


 


Urine Blood   


 


Urine Nitrate   


 


Urine Bilirubin   


 


Urine Urobilinogen   


 


Ur Leukocyte Esterase   


 


Urine WBC (Auto)   


 


Urine RBC (Auto)   


 


Ur Squamous Epith Cells   


 


Urine Bacteria   


 


Blood Type   


 


Antibody Screen   














  03/25/19 03/25/19 03/25/19





  11:26 11:50 14:44


 


WBC    15.2 H


 


RBC    3.59 L


 


Hgb    10.1 L


 


Hct    32.1 L


 


MCV    89.3  D


 


MCH    28.1


 


MCHC    31.5 L


 


RDW    17.0 H


 


Plt Count    178


 


MPV    8.8


 


Neut % (Auto)    84.4 H


 


Lymph % (Auto)    7.4 L


 


Mono % (Auto)    7.7


 


Eos % (Auto)    0.2


 


Baso % (Auto)    0.3


 


Neut # (Auto)    12.9 H


 


Lymph # (Auto)    1.1


 


Mono # (Auto)    1.2 H


 


Eos # (Auto)    0.0


 


Baso # (Auto)    0.0


 


Neutrophils % (Manual)    88 H


 


Band Neutrophils %    2


 


Lymphocytes % (Manual)    7 L


 


Monocytes % (Manual)    3


 


Differential Comment   


 


Platelet Estimate    Normal


 


Polychromasia    Slight


 


Hypochromasia (manual)    Slight


 


Anisocytosis (manual)    Slight


 


Ovalocytes    Slight


 


PT   


 


INR   


 


APTT   


 


Puncture Site   Rb 


 


pCO2   23 L 


 


pO2   267 H 


 


HCO3   6.8 L* 


 


ABG pH   7.03 L* 


 


ABG Total CO2   6.8 L 


 


ABG O2 Saturation   100.6 H 


 


ABG Base Excess   -23.3 L 


 


John Test   Na 


 


ABG Potassium   1.2 L* 


 


A-a O2 Difference   417.0 


 


Respiratory Index   1.6 


 


Glucose   111 H 


 


Lactate   6.0 H* 


 


Vent Mode   Prvc 


 


Mechanical Rate   16 


 


FiO2   100.0 


 


Tidal Volume   500 


 


PEEP   5 


 


Crit Value Called To   ABDIRASHID mcclure do 


 


Crit Value Called By   MOHINI lester rrt 


 


Crit Value Read Back   Y 


 


Blood Gas Notified Time   1200 


 


Sodium   158.0 H 


 


Potassium   


 


Chloride   131.0 H 


 


Carbon Dioxide   


 


Anion Gap   


 


BUN   


 


Creatinine   


 


Est GFR ( Amer)   


 


Est GFR (Non-Af Amer)   


 


Random Glucose   


 


Calcium   


 


Phosphorus   


 


Magnesium   


 


Total Bilirubin   


 


AST   


 


ALT   


 


Alkaline Phosphatase   


 


Total Creatine Kinase   


 


CK-MB (Mass)   


 


Troponin I   


 


NT-Pro-B Natriuret Pep   


 


Total Protein   


 


Albumin   


 


Globulin   


 


Albumin/Globulin Ratio   


 


Arterial Blood Potassium   1.2 L* 


 


Urine Color   


 


Urine Clarity   


 


Urine pH   


 


Ur Specific Gravity   


 


Urine Protein   


 


Urine Glucose (UA)   


 


Urine Ketones   


 


Urine Blood   


 


Urine Nitrate   


 


Urine Bilirubin   


 


Urine Urobilinogen   


 


Ur Leukocyte Esterase   


 


Urine WBC (Auto)   


 


Urine RBC (Auto)   


 


Ur Squamous Epith Cells   


 


Urine Bacteria   


 


Blood Type  A POSITIVE  


 


Antibody Screen  Negative  














  03/25/19 03/25/19 03/25/19





  14:44 14:44 16:40


 


WBC   


 


RBC   


 


Hgb   


 


Hct   


 


MCV   


 


MCH   


 


MCHC   


 


RDW   


 


Plt Count   


 


MPV   


 


Neut % (Auto)   


 


Lymph % (Auto)   


 


Mono % (Auto)   


 


Eos % (Auto)   


 


Baso % (Auto)   


 


Neut # (Auto)   


 


Lymph # (Auto)   


 


Mono # (Auto)   


 


Eos # (Auto)   


 


Baso # (Auto)   


 


Neutrophils % (Manual)   


 


Band Neutrophils %   


 


Lymphocytes % (Manual)   


 


Monocytes % (Manual)   


 


Differential Comment   


 


Platelet Estimate   


 


Polychromasia   


 


Hypochromasia (manual)   


 


Anisocytosis (manual)   


 


Ovalocytes   


 


PT   


 


INR   


 


APTT   


 


Puncture Site   


 


pCO2   


 


pO2   


 


HCO3   


 


ABG pH   


 


ABG Total CO2   


 


ABG O2 Saturation   


 


ABG Base Excess   


 


John Test   


 


ABG Potassium   


 


A-a O2 Difference   


 


Respiratory Index   


 


Glucose   


 


Lactate   


 


Vent Mode   


 


Mechanical Rate   


 


FiO2   


 


Tidal Volume   


 


PEEP   


 


Crit Value Called To   


 


Crit Value Called By   


 


Crit Value Read Back   


 


Blood Gas Notified Time   


 


Sodium  138  


 


Potassium  3.5 L  


 


Chloride  92 L  


 


Carbon Dioxide  25  


 


Anion Gap  24 H  


 


BUN  18  


 


Creatinine  7.9 H*  


 


Est GFR ( Amer)  8  


 


Est GFR (Non-Af Amer)  7  


 


Random Glucose  282 H  


 


Calcium  9.4  


 


Phosphorus  4.0  


 


Magnesium  2.4 H  


 


Total Bilirubin  0.7  


 


AST  527 H D  


 


ALT  446 H D  


 


Alkaline Phosphatase  74  


 


Total Creatine Kinase    3962 H


 


CK-MB (Mass)    103 H


 


Troponin I    61.5000 H*


 


NT-Pro-B Natriuret Pep   


 


Total Protein  7.5  


 


Albumin  4.5  


 


Globulin  3.0  


 


Albumin/Globulin Ratio  1.5  


 


Arterial Blood Potassium   


 


Urine Color   Yellow 


 


Urine Clarity   Hazy 


 


Urine pH   5.0 


 


Ur Specific Gravity   1.023 


 


Urine Protein   1+ H 


 


Urine Glucose (UA)   Normal 


 


Urine Ketones   Negative 


 


Urine Blood   Negative 


 


Urine Nitrate   Negative 


 


Urine Bilirubin   Negative 


 


Urine Urobilinogen   Normal 


 


Ur Leukocyte Esterase   Neg 


 


Urine WBC (Auto)   2 


 


Urine RBC (Auto)   3 


 


Ur Squamous Epith Cells   < 1 


 


Urine Bacteria   Rare 


 


Blood Type   


 


Antibody Screen   














  03/25/19





  19:30


 


WBC 


 


RBC 


 


Hgb 


 


Hct 


 


MCV 


 


MCH 


 


MCHC 


 


RDW 


 


Plt Count 


 


MPV 


 


Neut % (Auto) 


 


Lymph % (Auto) 


 


Mono % (Auto) 


 


Eos % (Auto) 


 


Baso % (Auto) 


 


Neut # (Auto) 


 


Lymph # (Auto) 


 


Mono # (Auto) 


 


Eos # (Auto) 


 


Baso # (Auto) 


 


Neutrophils % (Manual) 


 


Band Neutrophils % 


 


Lymphocytes % (Manual) 


 


Monocytes % (Manual) 


 


Differential Comment 


 


Platelet Estimate 


 


Polychromasia 


 


Hypochromasia (manual) 


 


Anisocytosis (manual) 


 


Ovalocytes 


 


PT 


 


INR 


 


APTT 


 


Puncture Site  Rba


 


pCO2  43


 


pO2  285 H


 


HCO3  15.3 L


 


ABG pH  7.17 L*


 


ABG Total CO2  17.0 L


 


ABG O2 Saturation  99.5 H


 


ABG Base Excess  -12.4 L


 


John Test  Pos


 


ABG Potassium  3.7


 


A-a O2 Difference  374.0


 


Respiratory Index  1.3


 


Glucose  247 H


 


Lactate  15.6 H*


 


Vent Mode  Prvc


 


Mechanical Rate  20


 


FiO2  100.0


 


Tidal Volume  500


 


PEEP  5


 


Crit Value Called To  Dr quan


 


Crit Value Called By  Marlo [facility]


 


Crit Value Read Back  Y


 


Blood Gas Notified Time  1935


 


Sodium  142.0


 


Potassium 


 


Chloride  100.0


 


Carbon Dioxide 


 


Anion Gap 


 


BUN 


 


Creatinine 


 


Est GFR ( Amer) 


 


Est GFR (Non-Af Amer) 


 


Random Glucose 


 


Calcium 


 


Phosphorus 


 


Magnesium 


 


Total Bilirubin 


 


AST 


 


ALT 


 


Alkaline Phosphatase 


 


Total Creatine Kinase 


 


CK-MB (Mass) 


 


Troponin I 


 


NT-Pro-B Natriuret Pep 


 


Total Protein 


 


Albumin 


 


Globulin 


 


Albumin/Globulin Ratio 


 


Arterial Blood Potassium  3.7


 


Urine Color 


 


Urine Clarity 


 


Urine pH 


 


Ur Specific Gravity 


 


Urine Protein 


 


Urine Glucose (UA) 


 


Urine Ketones 


 


Urine Blood 


 


Urine Nitrate 


 


Urine Bilirubin 


 


Urine Urobilinogen 


 


Ur Leukocyte Esterase 


 


Urine WBC (Auto) 


 


Urine RBC (Auto) 


 


Ur Squamous Epith Cells 


 


Urine Bacteria 


 


Blood Type 


 


Antibody Screen 











Radiology Impressions: 


                              Radiology Impressions





Chest X-Ray  03/25/19 10:32


IMPRESSION:


Endotracheal tube terminates 2.3 cm proximal to the wesley.  


Nasogastric tube terminates in the stomach.


 


Severe cardiomegaly and mild pulmonary venous congestion with 


redistribution.


 


 








Head CT  03/25/19 12:22


IMPRESSION:


Mild-moderate diffuse and confluent chronic periventricular white 


matter ischemic changes seen extending peripherally into the deep and 


subcortical white matter both cerebral hemispheres.  Additionally, 


there also appears to be scattered chronic bilateral basal nuclei 


lacunar type infarcts.  Note possibility of a small hyperacute 


infarct cannot be excluded based on this exam.


 


Mild to moderate generalized volume loss. 


 


Mucoperiosteal inflammatory changes seen within all the paranasal 


sinuses. 


 


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 


 


 











EKG/Cardiology Studies: 


Cardiology / EKG Studies





03/25/19 10:32


ELECTROCARDIOGRAM Stat 


   Comment: 


   Mode Of Transportation: BED


   Reason For Exam: chest pain











Fingerstick Blood Sugar Results: 271





Critical Care Progress Note





- Nutrition


Nutrition: 


                                    Nutrition











 Category Date Time Status


 


 NPO Diet [DIET] Diets  03/25/19 Dinner Active














Assessment/Plan





- Assessment and Plan (Free Text)


Assessment: 





s/p CPR


received 1 epi and bicarbonate


regained pulse and BP





continue code freeze


family informed

## 2019-03-25 NOTE — CT
Date of service: 



03/25/2019



PROCEDURE:  CT HEAD WITHOUT CONTRAST.



HISTORY:

Rule out hemorrhage 



COMPARISON:

No prior study available for comparison. 



TECHNIQUE:

Axial computed tomography images were obtained through the head/brain 

without intravenous contrast.  



Radiation dose:



Total exam DLP = 1418.95 mGy-cm.



This CT exam was performed using one or more of the following dose 

reduction techniques: Automated exposure control, adjustment of the 

mA and/or kV according to patient size, and/or use of iterative 

reconstruction technique.



FINDINGS:



HEMORRHAGE:

No intracranial hemorrhage. 



BRAIN:

Mild-moderate diffuse and confluent chronic periventricular white 

matter ischemic changes seen extending peripherally into the deep and 

subcortical white matter both cerebral hemispheres.  Additionally, 

there also appears to be scattered chronic bilateral basal nuclei 

lacunar type infarcts.  Note possibility of a small hyperacute 

infarct cannot be excluded based on this exam.



Mild to moderate generalized volume loss. 



Vascular calcifications both carotid siphons and vertebral arteries. 



VENTRICLES:

No obstructive hydrocephalus. 



CALVARIUM:

Calvarium intact



Note made of what probably represents a fusion anomaly (incomplete 

fusion left parasagittal anterior arch of C1 vertebral body. 



PARANASAL SINUSES:

Mild mucosal thickening seen within multiple ethmoid air cells with 

extension superiorly into the inferior aspect of the frontal sinus..  

Mucosal thickening with questionable small fluid levels both 

maxillary antra.  There is also minor mucosal thickening in the 

sphenoid sinus 



MASTOID AIR CELLS:

Unremarkable as visualized. No inflammatory changes.



OTHER FINDINGS:

Changes of bilateral cataract surgery. 



IMPRESSION:

Mild-moderate diffuse and confluent chronic periventricular white 

matter ischemic changes seen extending peripherally into the deep and 

subcortical white matter both cerebral hemispheres.  Additionally, 

there also appears to be scattered chronic bilateral basal nuclei 

lacunar type infarcts.  Note possibility of a small hyperacute 

infarct cannot be excluded based on this exam.



Mild to moderate generalized volume loss. 



Mucoperiosteal inflammatory changes seen within all the paranasal 

sinuses. 



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

## 2019-03-25 NOTE — CP.PCM.CON
History of Present Illness





- History of Present Illness


History of Present Illness: 


ICU Consult Note for Dr. Clark





This is a 63 y o male with PMhx HIV (dx in 2005, currently on HAART tx), DM, 

HTN, HLD, CAD s/p 3 stents, and ESRD on HD MWF, who presented to the ED [facility] 

s/p cardiac arrest at dialysis (completed 3 hrs of tx). As per EMS pt was eating

a protein bar and then arrested, possible choking episode. Pt was given Epi x6 

and CaCl in the field, shock x1 given for V-fib. Pt arrived in the ED s/p 

intubation, with CPR in progress. Epi x1 additionally given in ED, pt achieved 

ROSC at next pulse check, A-fib was present on EKG in ED. 10 mins later, pt 

became bradycardic and then pulseness. Epi x1 was given again and pt regained 

pulse. S/p IO placement in R tibia x1. Pt then lost pulse again, epi x1 given, 

CPR re-initiated. At next rhythm check pt regained pulse and organized rhythm 

was present on EKG monitor. Reason for ICU consult was for s/p cardiac arrest 

x3. Unable to obtain HPI and ROS from pt due to pt's current clinical status. 

Pt's  at bedside provided most of hx leading up to presentation to ED. As

per , pt presented with productive cough with white sputum since last 

Friday 3/22/19, instructed pt to go to ED because symptoms were not improving, 

but pt stated at time that he did not feel that terrible and would go to his 

dialysis appt this am before reconsidering. Compliant with home medications and 

dialysis treatments as per pt's .





PMhx: as noted above


PSurgHx: cardiac stents x3, s/p AV fistula placement in 2017


Allergies: peanut butter (hives)


Home meds: reviewed as per MAR


Fam hx: unknown 


Soc hx: Former light cigarette smoker quit 30 y ago; denies EtOH or illicit drug

use; lives at home with 





Primary nephrologist: Dr. Bell ([facility] [place])


Primary ID: Dr. Mcduffie








Past Patient History





- Past Social History


Smoking Status: Unknown If Ever Smoked





- PSYCHIATRIC


Hx Substance Use: No (UNKNOWN)





- SURGICAL HISTORY


Hx Surgeries:  (UNKNOWN)





- ANESTHESIA


Hx Anesthesia: No (UNKNOWN)





Meds


Allergies/Adverse Reactions: 


                                    Allergies











Allergy/AdvReac Type Severity Reaction Status Date / Time


 


No Known Allergies Allergy   Verified 03/25/19 10:41














Results





- Vital Signs


Recent Vital Signs: 


                                Last Vital Signs











Temp      


 


Pulse  93 H  03/25/19 11:33


 


Resp  16   03/25/19 11:33


 


BP  94/49 L  03/25/19 11:33


 


Pulse Ox  100   03/25/19 11:33














- Labs


Result Diagrams: 


                                 03/25/19 14:44





                                 03/25/19 14:44


Labs: 


                         Laboratory Results - last 24 hr











  03/25/19 03/25/19 03/25/19





  11:04 11:04 11:04


 


WBC  23.3 H  


 


RBC  3.46 L  


 


Hgb  9.5 L  


 


Hct  32.5 L  


 


MCV  93.8  


 


MCH  27.5  


 


MCHC  29.4 L  


 


RDW  16.7 H  


 


Plt Count  99 L  


 


MPV  8.3  


 


Neut % (Auto)  49.9 L  


 


Lymph % (Auto)  43.3 H  


 


Mono % (Auto)  4.9  


 


Eos % (Auto)  1.5  


 


Baso % (Auto)  0.4  


 


Neut # (Auto)  11.6 H  


 


Lymph # (Auto)  10.1 H  


 


Mono # (Auto)  1.1 H  


 


Eos # (Auto)  0.4  


 


Baso # (Auto)  0.1  


 


Differential Comment    


 


PT   12.5 H 


 


INR   1.1 


 


APTT   45 H 


 


Sodium    141


 


Potassium    3.1 L


 


Chloride    95 L


 


Carbon Dioxide    16 L


 


Anion Gap    32 H


 


BUN    13


 


Creatinine    6.6 H


 


Est GFR ( Amer)    10


 


Est GFR (Non-Af Amer)    9


 


Random Glucose    304 H


 


Calcium    9.6


 


Total Bilirubin    0.4


 


ALT    335 H


 


Alkaline Phosphatase    49


 


Total Creatine Kinase    277 H


 


Total Protein    6.4


 


Albumin    3.9


 


Globulin    2.4


 


Albumin/Globulin Ratio    1.6














Assessment & Plan





- Assessment and Plan (Free Text)


Assessment: 


This is a 63 y o male with PMhx HIV (dx in 2005, currently on HAART tx), DM, 

HTN, HLD, CAD s/p 3 stents, and ESRD on HD MWF, who presented to the ED [facility] 

s/p cardiac arrest at dialysis (completed 3 hrs of tx). Reason for ICU consult 

was for s/p cardiac arrest x3.


Plan: 


Neuro:


-Intubated, unable to assess at this time


-Cont to monitor


-CT head on admission: Mild-moderate diffuse and conflient chronic 

periventricular white matter ischemic changes seen extending peripherally into 

the deep and subcortical white matter both cerebral hemispheres. Additionally, 

there also appears to be scattered chronic b/l basal nuclei lacunar type 

infarcts. Note possibility of a small hyperacute infarct cannot be excluded 

based on this exam. Mild to moderate generalized volume loss. Mucoperiosteal 

inflammatory changes seen within all the paranasal sinuses. Fusion anomaly 

anterior arch C1.





Cardio:


-S/p cardiac arrest x3 as noted above in detail


-Hypothermic protocol


-Tylenol, Meperidine prn for chills


-Hx HTN, HLD, CAD s/p 3 stents


-Trop elevated on admission, cont to trend q8h


-BNP elevated on admission


-Cardiology consulted, Dr. Zamorano, recs appreciated


-Echo pending


-ASA, Plavix


-Lasix bid, monitor K


-Zetia


-Metoprolol, Procardia XL


-Crestor


-Pending A1c and lipid panel





Pulm:


-Currently on ventilator s/p intubation 2/2 cardiac arrest


-Metabolic acidosis present on ABG on admission: 7.03/23/267/6.8


-Duoneb q4h


-Cont to monitor


-Maintain O2 sat > 92%





GI:


-NPO


-Protonix


-No acute issues at this time





Renal:


-Hx ESRD on HD MWF


-Nephrology consulted (Dr. Augustine), recs appreciated


-BUN/Cr 13/6.6


-Cont to trend I's/O's





ID:


-Hx HIV on HAART rx, home meds restarted


-ID consulted, Dr. Arce, recs appreciated


-Leukocytosis


-On hypothermic protocol s/p cardiac arrest


-No anbx at this time





Heme:


-H/H demonstrates anemia, may be 2/2 HIV diagnosis


-Pending iron studies, B12, folate, retic ct


-Thrombocytopenia corrected on repeat CBC, cont to trend


-L-shift present





PPX:


-Protonix, Heparin


-Currently full code status








Pt seen, examined with, and plan discussed with Dr. Clark, attending 

physician.





Miguelangel Salinas DO PGY-1, Family Medicine Resident


Pager #[phone]

## 2019-03-26 VITALS — TEMPERATURE: 91.6 F

## 2019-03-26 VITALS — HEART RATE: 53 BPM | SYSTOLIC BLOOD PRESSURE: 61 MMHG | DIASTOLIC BLOOD PRESSURE: 14 MMHG

## 2019-03-26 VITALS — OXYGEN SATURATION: 19 %

## 2019-03-26 VITALS — RESPIRATION RATE: 24 BRPM

## 2019-03-26 LAB
% IRON SATURATION: 22 {NULL, NULL} (ref 20–55)
ALBUMIN SERPL-MCNC: 4.4 {NULL, G/DL} (ref 3.5–5)
ALBUMIN/GLOB SERPL: 1.5 {NULL, NULL} (ref 1–2.1)
ALT SERPL-CCNC: 377 {NULL, U/L} (ref 21–72)
ANISOCYTOSIS BLD QL SMEAR: SLIGHT {NULL, NULL}
ARTERIAL BLOOD GAS O2 SAT: 99.7 {NULL, %} (ref 95–98)
ARTERIAL BLOOD GAS O2 SAT: 99.9 {NULL, %} (ref 95–98)
ARTERIAL BLOOD GAS PCO2: 36 {NULL, MM/HG} (ref 35–45)
ARTERIAL BLOOD GAS PCO2: 39 {NULL, MM/HG} (ref 35–45)
ARTERIAL BLOOD GAS TCO2: 11 {NULL, MMOL/L} (ref 22–28)
ARTERIAL BLOOD GAS TCO2: 15.2 {NULL, MMOL/L} (ref 22–28)
ARTERIAL PATENCY WRIST A: (no result) {NULL, NULL}
AST SERPL-CCNC: 590 {NULL, U/L} (ref 17–59)
BASOPHILS # BLD AUTO: 0 {NULL, K/UL} (ref 0–0.2)
BASOPHILS NFR BLD: 0.1 {NULL, %} (ref 0–2)
BUN SERPL-MCNC: 26 {NULL, MG/DL} (ref 9–20)
CALCIUM SERPL-MCNC: 8.8 {NULL, MG/DL} (ref 8.6–10.4)
EOSINOPHIL # BLD AUTO: 0 {NULL, K/UL} (ref 0–0.7)
EOSINOPHIL NFR BLD: 0 {NULL, %} (ref 0–4)
ERYTHROCYTE [DISTWIDTH] IN BLOOD BY AUTOMATED COUNT: 17 {NULL, %} (ref 11.5–14.5)
FOLATE SERPL-MCNC: 6.3 {NULL, NG/ML}
GFR NON-AFRICAN AMERICAN: 6 {NULL, NULL}
GIANT PLATELETS BLD QL SMEAR: PRESENT {NULL, NULL}
HCO3 BLDA-SCNC: 14.8 {NULL, MMOL/L} (ref 21–28)
HCO3 BLDA-SCNC: 8.8 {NULL, MMOL/L} (ref 21–28)
HDLC SERPL-MCNC: 23 {NULL, MG/DL} (ref 30–70)
HGB BLD-MCNC: 9.6 {NULL, G/DL} (ref 12–18)
HYPOCHROMIC: SLIGHT {NULL, NULL}
INHALED O2 CONCENTRATION: 100 {NULL, %}
INHALED O2 CONCENTRATION: 70 {NULL, %}
IRON SERPL-MCNC: 46 {NULL, UG/DL} (ref 49–181)
LDLC SERPL-MCNC: 63 {NULL, MG/DL} (ref 0–129)
LG PLATELETS BLD QL SMEAR: PRESENT {NULL, NULL}
LYMPHOCYTE: 2 {NULL, %} (ref 20–40)
LYMPHOCYTE: 8 {NULL, %} (ref 20–40)
LYMPHOCYTES # BLD AUTO: 1.4 {NULL, K/UL} (ref 1–4.3)
LYMPHOCYTES NFR BLD AUTO: 7.2 {NULL, %} (ref 20–40)
MCH RBC QN AUTO: 28.1 {NULL, PG} (ref 27–31)
MCHC RBC AUTO-ENTMCNC: 31.2 {NULL, G/DL} (ref 33–37)
MCV RBC AUTO: 89.9 {NULL, FL} (ref 80–94)
MONOCYTE: 5 {NULL, %} (ref 0–10)
MONOCYTE: 7 {NULL, %} (ref 0–10)
MONOCYTES # BLD: 1 {NULL, K/UL} (ref 0–0.8)
MONOCYTES NFR BLD: 5 {NULL, %} (ref 0–10)
NEUTROPHILS # BLD: 17.2 {NULL, K/UL} (ref 1.8–7)
NEUTROPHILS NFR BLD AUTO: 74 {NULL, %} (ref 50–75)
NEUTROPHILS NFR BLD AUTO: 87.7 {NULL, %} (ref 50–75)
NEUTROPHILS NFR BLD AUTO: 88 {NULL, %} (ref 50–75)
NEUTS BAND NFR BLD: 11 {NULL, %} (ref 0–2)
NEUTS BAND NFR BLD: 5 {NULL, %} (ref 0–2)
NRBC BLD AUTO-RTO: 0.3 {NULL, %} (ref 0–2)
NRBC BLD AUTO-RTO: 1 {NULL, %} (ref 0–0)
OVALOCYTES BLD QL SMEAR: (no result) {NULL, NULL}
PH BLDA: 7.01 {NULL, NULL} (ref 7.35–7.45)
PH BLDA: 7.2 {NULL, NULL} (ref 7.35–7.45)
PLATELET # BLD EST: NORMAL {NULL, NULL}
PLATELET # BLD EST: NORMAL {NULL, NULL}
PLATELET # BLD: 183 {NULL, K/UL} (ref 130–400)
PMV BLD AUTO: 9 {NULL, FL} (ref 7.2–11.7)
PO2 BLDA: 260 {NULL, MM/HG} (ref 80–100)
PO2 BLDA: 290 {NULL, MM/HG} (ref 80–100)
POIKILOCYTOSIS BLD QL SMEAR: SLIGHT {NULL, NULL}
RBC # BLD AUTO: 3.42 {NULL, MIL/UL} (ref 4.4–5.9)
SCHISTOCYTES BLD QL SMEAR: SLIGHT {NULL, NULL}
TIBC SERPL-MCNC: 210 {NULL, UG/DL} (ref 250–450)
TOTAL CELLS COUNTED BLD: 100 {NULL, NULL}
TOTAL CELLS COUNTED BLD: 100 {NULL, NULL}
VIT B12 SERPL-MCNC: 781 {NULL, PG/ML} (ref 239–931)
WBC # BLD AUTO: 19.6 {NULL, K/UL} (ref 4.8–10.8)

## 2019-03-26 PROCEDURE — 3E033XZ INTRODUCTION OF VASOPRESSOR INTO PERIPHERAL VEIN, PERCUTANEOUS APPROACH: ICD-10-PCS | Performed by: INTERNAL MEDICINE

## 2019-03-26 RX ADMIN — MINERAL OIL, PETROLATUM SCH GM: 425; 568 OINTMENT OPHTHALMIC at 12:13

## 2019-03-26 RX ADMIN — DOPAMINE HYDROCHLORIDE IN DEXTROSE PRN MLS/HR: 1.6 INJECTION, SOLUTION INTRAVENOUS at 12:38

## 2019-03-26 RX ADMIN — HUMAN INSULIN SCH: 100 INJECTION, SOLUTION SUBCUTANEOUS at 08:16

## 2019-03-26 RX ADMIN — IPRATROPIUM BROMIDE AND ALBUTEROL SULFATE SCH: .5; 3 SOLUTION RESPIRATORY (INHALATION) at 12:53

## 2019-03-26 RX ADMIN — MINERAL OIL, PETROLATUM SCH GM: 425; 568 OINTMENT OPHTHALMIC at 08:00

## 2019-03-26 RX ADMIN — DOPAMINE HYDROCHLORIDE IN DEXTROSE PRN MLS/HR: 1.6 INJECTION, SOLUTION INTRAVENOUS at 08:50

## 2019-03-26 RX ADMIN — HUMAN INSULIN SCH UNIT: 100 INJECTION, SOLUTION SUBCUTANEOUS at 05:48

## 2019-03-26 RX ADMIN — HUMAN INSULIN SCH UNIT: 100 INJECTION, SOLUTION SUBCUTANEOUS at 03:49

## 2019-03-26 RX ADMIN — HUMAN INSULIN SCH: 100 INJECTION, SOLUTION SUBCUTANEOUS at 07:15

## 2019-03-26 RX ADMIN — HUMAN INSULIN SCH UNIT: 100 INJECTION, SOLUTION SUBCUTANEOUS at 00:09

## 2019-03-26 RX ADMIN — HUMAN INSULIN SCH UNIT: 100 INJECTION, SOLUTION SUBCUTANEOUS at 01:18

## 2019-03-26 RX ADMIN — HUMAN INSULIN SCH UNIT: 100 INJECTION, SOLUTION SUBCUTANEOUS at 04:49

## 2019-03-26 RX ADMIN — DOPAMINE HYDROCHLORIDE IN DEXTROSE PRN MLS/HR: 1.6 INJECTION, SOLUTION INTRAVENOUS at 03:53

## 2019-03-26 RX ADMIN — IPRATROPIUM BROMIDE AND ALBUTEROL SULFATE SCH ML: .5; 3 SOLUTION RESPIRATORY (INHALATION) at 07:44

## 2019-03-26 RX ADMIN — IPRATROPIUM BROMIDE AND ALBUTEROL SULFATE SCH ML: .5; 3 SOLUTION RESPIRATORY (INHALATION) at 00:57

## 2019-03-26 RX ADMIN — HUMAN INSULIN SCH: 100 INJECTION, SOLUTION SUBCUTANEOUS at 12:14

## 2019-03-26 RX ADMIN — HUMAN INSULIN SCH UNIT: 100 INJECTION, SOLUTION SUBCUTANEOUS at 06:35

## 2019-03-26 RX ADMIN — DOPAMINE HYDROCHLORIDE IN DEXTROSE PRN MLS/HR: 1.6 INJECTION, SOLUTION INTRAVENOUS at 16:42

## 2019-03-26 RX ADMIN — HUMAN INSULIN SCH: 100 INJECTION, SOLUTION SUBCUTANEOUS at 10:33

## 2019-03-26 RX ADMIN — IPRATROPIUM BROMIDE AND ALBUTEROL SULFATE SCH ML: .5; 3 SOLUTION RESPIRATORY (INHALATION) at 05:00

## 2019-03-26 RX ADMIN — HUMAN INSULIN SCH UNIT: 100 INJECTION, SOLUTION SUBCUTANEOUS at 02:23

## 2019-03-26 NOTE — RAD
Date of service: 



03/26/2019



HISTORY:

Evaluate for interval change 



COMPARISON:

Comparison made with through prior chest radiograph 03/25/2029 



TECHNIQUE:

1 view obtained.



FINDINGS:

In situ ETT, the tip of which lies just subjacent to the superior 

margin of the transverse portion of the aortic arch..  Note that the 

wesley is not seen with certainty and the tip is estimated 

approximately 2.82.9 cm above wesley.  In situ NGT, tip of which lies 

left upper quadrant of the abdomen. 



LUNGS:

Improved pulmonary venous congestion 



PLEURA:

No significant pleural effusion identified.



CARDIOVASCULAR:

Mild aortic atherosclerotic calcification present.



Heart remains enlarged.  



OSSEOUS STRUCTURES:

No significant abnormalities.



VISUALIZED UPPER ABDOMEN:

Normal.



OTHER FINDINGS:

None.



IMPRESSION:

ETT and NGT as above 



No active disease.   Improved pulmonary venous congestion.

## 2019-03-26 NOTE — CP.PCM.CON
History of Present Illness





- History of Present Illness


History of Present Illness: 





63 y o male with PMhx HIV (dx in 2005, currently on HAART tx), DM, HTN, HLD, CAD

s/p 3 stents, and ESRD on HD MWF, who presented to the ED [facility] s/p cardiac 

arrest at dialysis (completed 3 hrs of tx). As per EMS pt was eating a protein 

bar and then arrested, possible choking episode. Pt was given Epi x6 and CaCl in

the field, shock x1 given for V-fib. Pt arrived in the ED s/p intubation, with 

CPR in progress. Epi x1 additionally given in ED, pt achieved ROSC at next pulse

check, A-fib was present on EKG in ED. 10 mins later, pt became bradycardic and 

then pulseness. Epi x1 was given again and pt regained pulse. S/p IO placement 

in R tibia x1. Pt then lost pulse again, epi x1 given, CPR re-initiated. At next

rhythm check pt regained pulse and organized rhythm was present on EKG monitor. 

Reason for ICU consult was for s/p cardiac arrest x3. Unable to obtain HPI and 

ROS from pt due to pt's current clinical status. Pt's  at bedside 

provided most of hx leading up to presentation to ED. As per , pt 

presented with productive cough with white sputum since last Friday 3/22/19, 

instructed pt to go to ED because symptoms were not improving, but pt stated at 

time that he did not feel that terrible and would go to his dialysis appt this 

am before reconsidering. Compliant with home medications and dialysis treatments

as per pt's .





PMhx: as noted above


PSurgHx: cardiac stents x3, s/p AV fistula placement in 2017


Allergies: peanut butter (hives)


Home meds: reviewed as per MAR


Fam hx: unknown 


Soc hx: Former light cigarette smoker quit 30 y ago; denies EtOH or illicit drug

use; lives at home with 








Review of Systems





- Review of Systems


Systems not reviewed;Unavailable: Altered Mental Status


All systems: reviewed and no additional remarkable complaints except





Past Patient History





- Past Medical History & Family History


Past Medical History?: Yes





- Past Social History


Smoking Status: Unknown If Ever Smoked





- CARDIAC


Hx Hypercholesterolemia: Yes


Hx Hypertension: Yes


Other/Comment: Hx of CAD, coronary stents x 3 - placed in 2018





- PULMONARY


Hx Respiratory Disorders: No





- NEUROLOGICAL


Hx Neurological Disorder: No





- HEENT


Hx Blind: Yes (Both eyes)





- RENAL


Hx Chronic Kidney Disease: Yes


Hx Dialysis: Yes (Started in March 2018)


Type of Dialysis Access: Left arm fistula


Date of Last Dialysis Treatment: 03/25/19





- ENDOCRINE/METABOLIC


Hx Diabetes Mellitus Type 2: Yes





- HEMATOLOGICAL/ONCOLOGICAL


Hx Human Immunodeficiency Virus (HIV): Yes





- INTEGUMENTARY


Hx Dermatological Problems: No





- MUSCULOSKELETAL/RHEUMATOLOGICAL


Hx Musculoskeletal Disorders: No


Hx Falls: No





- GASTROINTESTINAL


Hx Gastrointestinal Disorders: No





- GENITOURINARY/GYNECOLOGICAL


Hx Genitourinary Disorders: No





- PSYCHIATRIC


Hx Substance Use: No (UNKNOWN)





- SURGICAL HISTORY


Hx Surgeries:  (UNKNOWN)





- ANESTHESIA


Hx Anesthesia: No (UNKNOWN)





Meds


Allergies/Adverse Reactions: 


                                    Allergies











Allergy/AdvReac Type Severity Reaction Status Date / Time


 


Peanut butter Allergy  RASH Uncoded 03/25/19 19:34














- Medications


Medications: 


                               Current Medications





Albuterol/Ipratropium (Duoneb 3 Mg/0.5 Mg (3 Ml) Ud)  3 ml INH RQ4 [facility]


   Last Admin: 03/26/19 07:44 Dose:  3 ml


Artificial Tears (Lacri-Lube)  1 gm OU Q4H [facility]


Calcium Acetate (Phoslo)  667 mg PO DAILY [facility]


   Last Admin: 03/26/19 09:13 Dose:  Not Given


Ezetimibe (Zetia)  10 mg PO HS [facility]


   Last Admin: 03/25/19 21:44 Dose:  Not Given


Famotidine (Pepcid)  20 mg PO DAILY [facility]


   Last Admin: 03/26/19 09:13 Dose:  Not Given


Gabapentin (Neurontin)  100 mg PO HS [facility]


   Last Admin: 03/25/19 21:44 Dose:  Not Given


Heparin Sodium (Porcine) (Heparin)  5,000 units SC Q8 [facility]


   Last Admin: 03/26/19 05:48 Dose:  5,000 units


Home Med (Patient's Own Medication)  1 tab PO DAILY [facility]


Norepinephrine Bitartrate 4 mg (/ Sodium Chloride)  254 mls @ 15.24 mls/hr IV 

.[id number] PRN; Protocol


   PRN Reason: TITRATE PER MD ORDER


   Last Admin: 03/26/19 09:12 Dose:  10 mcg/min, 38.1 mls/hr


Dopamine HCl/Dextrose (Dopamine 400mg/250ml D5w)  400 mg in 250 mls @ 7.032 

mls/hr IV .Q24H PRN; Protocol


   PRN Reason: TITRATE PER MD ORDER


   Last Admin: 03/26/19 08:50 Dose:  20 mcg/kg/min, 70.319 mls/hr


Vasopressin 40 units/ Dextrose  42 mls @ 0.63 mls/hr IV .Q24H TANIKA; Protocol


   Last Admin: 03/26/19 09:48 Dose:  0.01 units/min, 0.63 mls/hr


Sodium Bicarbonate 150 meq/ (Dextrose)  1,150 mls @ 60 mls/hr IV .[id number] [facility]


   Last Admin: 03/26/19 08:15 Dose:  60 mls/hr


Epinephrine HCl 1 mg/ Sodium (Chloride)  251 mls @ 15.06 mls/hr IV .[id number] PRN;

Protocol


   PRN Reason: TITRATE PER MD ORDER


Insulin Human Regular (Novolin R)  0 unit SC Q1H [facility]; Protocol


   Last Admin: 03/26/19 08:16 Dose:  Not Given


Meperidine HCl (Demerol)  25 mg IVP Q30M PRN


   PRN Reason: Rigors


Potassium Chloride (Potassium Chloride Oral Soln)  20 meq PO DAILY [facility]


   Last Admin: 03/26/19 09:14 Dose:  Not Given


Raltegravir (Isentress)  400 mg PO Q12H [facility]; Protocol











Physical Exam





- Constitutional


Appears: Chronically Ill





- Head Exam


Head Exam: NORMOCEPHALIC





- Eye Exam


Eye Exam: absent: Scleral icterus





- ENT Exam


ENT Exam: Mucous Membranes Dry





- Neck Exam


Neck exam: Negative for: Lymphadenopathy





- Respiratory Exam


Respiratory Exam: Decreased Breath Sounds





- Cardiovascular Exam


Cardiovascular Exam: REGULAR RHYTHM





- GI/Abdominal Exam


GI & Abdominal Exam: Diminished Bowel Sounds





- Rectal Exam


Rectal Exam: Deferred





-  Exam


 Exam: NORMAL INSPECTION





- Extremities Exam


Extremities exam: Positive for: pedal edema





- Back Exam


Back exam: absent: CVA tenderness (L), CVA tenderness (R)





- Neurological Exam


Neurological exam: Altered





- Psychiatric Exam


Psychiatric exam: Depressed





- Skin


Skin Exam: Dry





Results





- Vital Signs


Recent Vital Signs: 


                                Last Vital Signs











Temp  91.6 F L  03/26/19 08:00


 


Pulse  79   03/26/19 09:00


 


Resp  14   03/26/19 09:00


 


BP  99/28 L  03/26/19 09:48


 


Pulse Ox  90 L  03/26/19 08:29














- Labs


Result Diagrams: 


                                 03/26/19 05:51





                                 03/26/19 05:50


Labs: 


                         Laboratory Results - last 24 hr











  03/25/19 03/25/19 03/25/19





  11:04 11:04 11:04


 


WBC  23.3 H  


 


RBC  3.46 L  


 


Hgb  9.5 L  


 


Hct  32.5 L  


 


MCV  93.8  


 


MCH  27.5  


 


MCHC  29.4 L  


 


RDW  16.7 H  


 


Plt Count  99 L  


 


MPV  8.3  


 


Neut % (Auto)  49.9 L  


 


Lymph % (Auto)  43.3 H  


 


Mono % (Auto)  4.9  


 


Eos % (Auto)  1.5  


 


Baso % (Auto)  0.4  


 


Neut # (Auto)  11.6 H  


 


Lymph # (Auto)  10.1 H  


 


Mono # (Auto)  1.1 H  


 


Eos # (Auto)  0.4  


 


Baso # (Auto)  0.1  


 


Neutrophils % (Manual)   


 


Band Neutrophils %   


 


Lymphocytes % (Manual)   


 


Monocytes % (Manual)   


 


Nucleated RBC %   


 


Differential Comment    


 


Platelet Estimate   


 


Large Platelets   


 


Giant Platelets   


 


Polychromasia   


 


Hypochromasia (manual)   


 


Poikilocytosis (manual   


 


Anisocytosis (manual)   


 


Ovalocytes   


 


Schistocytes   


 


Retic Count   


 


PT   12.5 H 


 


INR   1.1 


 


APTT   45 H 


 


Puncture Site   


 


pCO2   


 


pO2   


 


HCO3   


 


ABG pH   


 


ABG Total CO2   


 


ABG O2 Saturation   


 


ABG Base Excess   


 


John Test   


 


ABG Potassium   


 


A-a O2 Difference   


 


Respiratory Index   


 


Glucose   


 


Lactate   


 


Vent Mode   


 


Mechanical Rate   


 


FiO2   


 


Tidal Volume   


 


PEEP   


 


Crit Value Called To   


 


Crit Value Called By   


 


Crit Value Read Back   


 


Blood Gas Notified Time   


 


Sodium    141


 


Potassium    3.1 L


 


Chloride    95 L


 


Carbon Dioxide    16 L


 


Anion Gap    32 H


 


BUN    13


 


Creatinine    6.6 H


 


Est GFR ( Amer)    10


 


Est GFR (Non-Af Amer)    9


 


POC Glucose (mg/dL)   


 


Random Glucose    304 H


 


Hemoglobin A1c   


 


Calcium    9.6


 


Phosphorus   


 


Magnesium   


 


Iron   


 


TIBC   


 


% Saturation   


 


Transferrin   


 


Ferritin   


 


Total Bilirubin    0.4


 


AST    266 H


 


ALT    335 H


 


Alkaline Phosphatase    49


 


Total Creatine Kinase    277 H


 


CK-MB (Mass)    5.15 H


 


Troponin I    0.2240 H*


 


NT-Pro-B Natriuret Pep    03559 H


 


Total Protein    6.4


 


Albumin    3.9


 


Globulin    2.4


 


Albumin/Globulin Ratio    1.6


 


Triglycerides   


 


Cholesterol   


 


LDL Cholesterol Direct   


 


HDL Cholesterol   


 


Vitamin B12   


 


Folate   


 


Arterial Blood Potassium   


 


Urine Color   


 


Urine Clarity   


 


Urine pH   


 


Ur Specific Gravity   


 


Urine Protein   


 


Urine Glucose (UA)   


 


Urine Ketones   


 


Urine Blood   


 


Urine Nitrate   


 


Urine Bilirubin   


 


Urine Urobilinogen   


 


Ur Leukocyte Esterase   


 


Urine WBC (Auto)   


 


Urine RBC (Auto)   


 


Ur Squamous Epith Cells   


 


Urine Bacteria   


 


Blood Type   


 


Antibody Screen   














  03/25/19 03/25/19 03/25/19





  11:26 11:50 14:44


 


WBC    15.2 H


 


RBC    3.59 L


 


Hgb    10.1 L


 


Hct    32.1 L


 


MCV    89.3  D


 


MCH    28.1


 


MCHC    31.5 L


 


RDW    17.0 H


 


Plt Count    178


 


MPV    8.8


 


Neut % (Auto)    84.4 H


 


Lymph % (Auto)    7.4 L


 


Mono % (Auto)    7.7


 


Eos % (Auto)    0.2


 


Baso % (Auto)    0.3


 


Neut # (Auto)    12.9 H


 


Lymph # (Auto)    1.1


 


Mono # (Auto)    1.2 H


 


Eos # (Auto)    0.0


 


Baso # (Auto)    0.0


 


Neutrophils % (Manual)    88 H


 


Band Neutrophils %    2


 


Lymphocytes % (Manual)    7 L


 


Monocytes % (Manual)    3


 


Nucleated RBC %   


 


Differential Comment   


 


Platelet Estimate    Normal


 


Large Platelets   


 


Giant Platelets   


 


Polychromasia    Slight


 


Hypochromasia (manual)    Slight


 


Poikilocytosis (manual   


 


Anisocytosis (manual)    Slight


 


Ovalocytes    Slight


 


Schistocytes   


 


Retic Count   


 


PT   


 


INR   


 


APTT   


 


Puncture Site   Rb 


 


pCO2   23 L 


 


pO2   267 H 


 


HCO3   6.8 L* 


 


ABG pH   7.03 L* 


 


ABG Total CO2   6.8 L 


 


ABG O2 Saturation   100.6 H 


 


ABG Base Excess   -23.3 L 


 


John Test   Na 


 


ABG Potassium   1.2 L* 


 


A-a O2 Difference   417.0 


 


Respiratory Index   1.6 


 


Glucose   111 H 


 


Lactate   6.0 H* 


 


Vent Mode   Prvc 


 


Mechanical Rate   16 


 


FiO2   100.0 


 


Tidal Volume   500 


 


PEEP   5 


 


Crit Value Called To   T ren do 


 


Crit Value Called By   MOHINI lester rrt 


 


Crit Value Read Back   Y 


 


Blood Gas Notified Time   1200 


 


Sodium   158.0 H 


 


Potassium   


 


Chloride   131.0 H 


 


Carbon Dioxide   


 


Anion Gap   


 


BUN   


 


Creatinine   


 


Est GFR ( Amer)   


 


Est GFR (Non-Af Amer)   


 


POC Glucose (mg/dL)   


 


Random Glucose   


 


Hemoglobin A1c   


 


Calcium   


 


Phosphorus   


 


Magnesium   


 


Iron   


 


TIBC   


 


% Saturation   


 


Transferrin   


 


Ferritin   


 


Total Bilirubin   


 


AST   


 


ALT   


 


Alkaline Phosphatase   


 


Total Creatine Kinase   


 


CK-MB (Mass)   


 


Troponin I   


 


NT-Pro-B Natriuret Pep   


 


Total Protein   


 


Albumin   


 


Globulin   


 


Albumin/Globulin Ratio   


 


Triglycerides   


 


Cholesterol   


 


LDL Cholesterol Direct   


 


HDL Cholesterol   


 


Vitamin B12   


 


Folate   


 


Arterial Blood Potassium   1.2 L* 


 


Urine Color   


 


Urine Clarity   


 


Urine pH   


 


Ur Specific Gravity   


 


Urine Protein   


 


Urine Glucose (UA)   


 


Urine Ketones   


 


Urine Blood   


 


Urine Nitrate   


 


Urine Bilirubin   


 


Urine Urobilinogen   


 


Ur Leukocyte Esterase   


 


Urine WBC (Auto)   


 


Urine RBC (Auto)   


 


Ur Squamous Epith Cells   


 


Urine Bacteria   


 


Blood Type  A POSITIVE  


 


Antibody Screen  Negative  














  03/25/19 03/25/19 03/25/19





  14:44 14:44 16:40


 


WBC   


 


RBC   


 


Hgb   


 


Hct   


 


MCV   


 


MCH   


 


MCHC   


 


RDW   


 


Plt Count   


 


MPV   


 


Neut % (Auto)   


 


Lymph % (Auto)   


 


Mono % (Auto)   


 


Eos % (Auto)   


 


Baso % (Auto)   


 


Neut # (Auto)   


 


Lymph # (Auto)   


 


Mono # (Auto)   


 


Eos # (Auto)   


 


Baso # (Auto)   


 


Neutrophils % (Manual)   


 


Band Neutrophils %   


 


Lymphocytes % (Manual)   


 


Monocytes % (Manual)   


 


Nucleated RBC %   


 


Differential Comment   


 


Platelet Estimate   


 


Large Platelets   


 


Giant Platelets   


 


Polychromasia   


 


Hypochromasia (manual)   


 


Poikilocytosis (manual   


 


Anisocytosis (manual)   


 


Ovalocytes   


 


Schistocytes   


 


Retic Count   


 


PT   


 


INR   


 


APTT   


 


Puncture Site   


 


pCO2   


 


pO2   


 


HCO3   


 


ABG pH   


 


ABG Total CO2   


 


ABG O2 Saturation   


 


ABG Base Excess   


 


John Test   


 


ABG Potassium   


 


A-a O2 Difference   


 


Respiratory Index   


 


Glucose   


 


Lactate   


 


Vent Mode   


 


Mechanical Rate   


 


FiO2   


 


Tidal Volume   


 


PEEP   


 


Crit Value Called To   


 


Crit Value Called By   


 


Crit Value Read Back   


 


Blood Gas Notified Time   


 


Sodium  138  


 


Potassium  3.5 L  


 


Chloride  92 L  


 


Carbon Dioxide  25  


 


Anion Gap  24 H  


 


BUN  18  


 


Creatinine  7.9 H*  


 


Est GFR ( Amer)  8  


 


Est GFR (Non-Af Amer)  7  


 


POC Glucose (mg/dL)   


 


Random Glucose  282 H  


 


Hemoglobin A1c   


 


Calcium  9.4  


 


Phosphorus  4.0  


 


Magnesium  2.4 H  


 


Iron   


 


TIBC   


 


% Saturation   


 


Transferrin   


 


Ferritin   


 


Total Bilirubin  0.7  


 


AST  527 H D  


 


ALT  446 H D  


 


Alkaline Phosphatase  74  


 


Total Creatine Kinase    3962 H


 


CK-MB (Mass)    103 H


 


Troponin I    61.5000 H*


 


NT-Pro-B Natriuret Pep   


 


Total Protein  7.5  


 


Albumin  4.5  


 


Globulin  3.0  


 


Albumin/Globulin Ratio  1.5  


 


Triglycerides   


 


Cholesterol   


 


LDL Cholesterol Direct   


 


HDL Cholesterol   


 


Vitamin B12   


 


Folate   


 


Arterial Blood Potassium   


 


Urine Color   Yellow 


 


Urine Clarity   Hazy 


 


Urine pH   5.0 


 


Ur Specific Gravity   1.023 


 


Urine Protein   1+ H 


 


Urine Glucose (UA)   Normal 


 


Urine Ketones   Negative 


 


Urine Blood   Negative 


 


Urine Nitrate   Negative 


 


Urine Bilirubin   Negative 


 


Urine Urobilinogen   Normal 


 


Ur Leukocyte Esterase   Neg 


 


Urine WBC (Auto)   2 


 


Urine RBC (Auto)   3 


 


Ur Squamous Epith Cells   < 1 


 


Urine Bacteria   Rare 


 


Blood Type   


 


Antibody Screen   














  03/25/19 03/25/19 03/25/19





  19:30 22:15 22:15


 


WBC   20.8 H 


 


RBC   3.36 L 


 


Hgb   9.5 L 


 


Hct   31.0 L 


 


MCV   92.4  D 


 


MCH   28.2 


 


MCHC   30.6 L 


 


RDW   17.0 H 


 


Plt Count   181 


 


MPV   9.2 


 


Neut % (Auto)   91.2 H 


 


Lymph % (Auto)   3.8 L 


 


Mono % (Auto)   4.8 


 


Eos % (Auto)   0.1 


 


Baso % (Auto)   0.1 


 


Neut # (Auto)   18.9 H 


 


Lymph # (Auto)   0.8 L 


 


Mono # (Auto)   1.0 H 


 


Eos # (Auto)   0.0 


 


Baso # (Auto)   0.0 


 


Neutrophils % (Manual)   88 H 


 


Band Neutrophils %   5 H 


 


Lymphocytes % (Manual)   2 L 


 


Monocytes % (Manual)   5 


 


Nucleated RBC %   


 


Differential Comment   


 


Platelet Estimate   Normal 


 


Large Platelets   


 


Giant Platelets   


 


Polychromasia   


 


Hypochromasia (manual)   


 


Poikilocytosis (manual   


 


Anisocytosis (manual)   


 


Ovalocytes   


 


Schistocytes   


 


Retic Count   


 


PT    13.7 H


 


INR    1.3


 


APTT    40 H D


 


Puncture Site  Rba  


 


pCO2  43  


 


pO2  285 H  


 


HCO3  15.3 L  


 


ABG pH  7.17 L*  


 


ABG Total CO2  17.0 L  


 


ABG O2 Saturation  99.5 H  


 


ABG Base Excess  -12.4 L  


 


John Test  Pos  


 


ABG Potassium  3.7  


 


A-a O2 Difference  374.0  


 


Respiratory Index  1.3  


 


Glucose  247 H  


 


Lactate  15.6 H*  


 


Vent Mode  Prvc  


 


Mechanical Rate  20  


 


FiO2  100.0  


 


Tidal Volume  500  


 


PEEP  5  


 


Crit Value Called To  Dr quan  


 


Crit Value Called By  [facility]  


 


Crit Value Read Back  Y  


 


Blood Gas Notified Time  1935  


 


Sodium  142.0  


 


Potassium   


 


Chloride  100.0  


 


Carbon Dioxide   


 


Anion Gap   


 


BUN   


 


Creatinine   


 


Est GFR ( Amer)   


 


Est GFR (Non-Af Amer)   


 


POC Glucose (mg/dL)   


 


Random Glucose   


 


Hemoglobin A1c   


 


Calcium   


 


Phosphorus   


 


Magnesium   


 


Iron   


 


TIBC   


 


% Saturation   


 


Transferrin   


 


Ferritin   


 


Total Bilirubin   


 


AST   


 


ALT   


 


Alkaline Phosphatase   


 


Total Creatine Kinase   


 


CK-MB (Mass)   


 


Troponin I   


 


NT-Pro-B Natriuret Pep   


 


Total Protein   


 


Albumin   


 


Globulin   


 


Albumin/Globulin Ratio   


 


Triglycerides   


 


Cholesterol   


 


LDL Cholesterol Direct   


 


HDL Cholesterol   


 


Vitamin B12   


 


Folate   


 


Arterial Blood Potassium  3.7  


 


Urine Color   


 


Urine Clarity   


 


Urine pH   


 


Ur Specific Gravity   


 


Urine Protein   


 


Urine Glucose (UA)   


 


Urine Ketones   


 


Urine Blood   


 


Urine Nitrate   


 


Urine Bilirubin   


 


Urine Urobilinogen   


 


Ur Leukocyte Esterase   


 


Urine WBC (Auto)   


 


Urine RBC (Auto)   


 


Ur Squamous Epith Cells   


 


Urine Bacteria   


 


Blood Type   


 


Antibody Screen   














  03/25/19 03/25/19 03/26/19





  22:15 22:15 00:04


 


WBC   


 


RBC   


 


Hgb   


 


Hct   


 


MCV   


 


MCH   


 


MCHC   


 


RDW   


 


Plt Count   


 


MPV   


 


Neut % (Auto)   


 


Lymph % (Auto)   


 


Mono % (Auto)   


 


Eos % (Auto)   


 


Baso % (Auto)   


 


Neut # (Auto)   


 


Lymph # (Auto)   


 


Mono # (Auto)   


 


Eos # (Auto)   


 


Baso # (Auto)   


 


Neutrophils % (Manual)   


 


Band Neutrophils %   


 


Lymphocytes % (Manual)   


 


Monocytes % (Manual)   


 


Nucleated RBC %   


 


Differential Comment   


 


Platelet Estimate   


 


Large Platelets   


 


Giant Platelets   


 


Polychromasia   


 


Hypochromasia (manual)   


 


Poikilocytosis (manual   


 


Anisocytosis (manual)   


 


Ovalocytes   


 


Schistocytes   


 


Retic Count   


 


PT   


 


INR   


 


APTT   


 


Puncture Site   


 


pCO2   


 


pO2   


 


HCO3   


 


ABG pH   


 


ABG Total CO2   


 


ABG O2 Saturation   


 


ABG Base Excess   


 


John Test   


 


ABG Potassium   


 


A-a O2 Difference   


 


Respiratory Index   


 


Glucose   


 


Lactate   


 


Vent Mode   


 


Mechanical Rate   


 


FiO2   


 


Tidal Volume   


 


PEEP   


 


Crit Value Called To   


 


Crit Value Called By   


 


Crit Value Read Back   


 


Blood Gas Notified Time   


 


Sodium  140  


 


Potassium  3.4 L  


 


Chloride  93 L  


 


Carbon Dioxide  19 L  


 


Anion Gap  30 H  


 


BUN  21 H  


 


Creatinine  8.7 H*  


 


Est GFR ( Amer)  8  


 


Est GFR (Non-Af Amer)  6  


 


POC Glucose (mg/dL)    260 H


 


Random Glucose  240 H  


 


Hemoglobin A1c   


 


Calcium  9.1  


 


Phosphorus  6.9 H  


 


Magnesium  2.6 H  


 


Iron   


 


TIBC   


 


% Saturation   


 


Transferrin   


 


Ferritin   


 


Total Bilirubin   


 


AST   


 


ALT   


 


Alkaline Phosphatase   


 


Total Creatine Kinase   5933 H 


 


CK-MB (Mass)   168 H 


 


Troponin I   107.0000 H* 


 


NT-Pro-B Natriuret Pep   


 


Total Protein   


 


Albumin   


 


Globulin   


 


Albumin/Globulin Ratio   


 


Triglycerides   


 


Cholesterol   


 


LDL Cholesterol Direct   


 


HDL Cholesterol   


 


Vitamin B12   


 


Folate   


 


Arterial Blood Potassium   


 


Urine Color   


 


Urine Clarity   


 


Urine pH   


 


Ur Specific Gravity   


 


Urine Protein   


 


Urine Glucose (UA)   


 


Urine Ketones   


 


Urine Blood   


 


Urine Nitrate   


 


Urine Bilirubin   


 


Urine Urobilinogen   


 


Ur Leukocyte Esterase   


 


Urine WBC (Auto)   


 


Urine RBC (Auto)   


 


Ur Squamous Epith Cells   


 


Urine Bacteria   


 


Blood Type   


 


Antibody Screen   














  03/26/19 03/26/19 03/26/19





  01:13 02:20 03:47


 


WBC   


 


RBC   


 


Hgb   


 


Hct   


 


MCV   


 


MCH   


 


MCHC   


 


RDW   


 


Plt Count   


 


MPV   


 


Neut % (Auto)   


 


Lymph % (Auto)   


 


Mono % (Auto)   


 


Eos % (Auto)   


 


Baso % (Auto)   


 


Neut # (Auto)   


 


Lymph # (Auto)   


 


Mono # (Auto)   


 


Eos # (Auto)   


 


Baso # (Auto)   


 


Neutrophils % (Manual)   


 


Band Neutrophils %   


 


Lymphocytes % (Manual)   


 


Monocytes % (Manual)   


 


Nucleated RBC %   


 


Differential Comment   


 


Platelet Estimate   


 


Large Platelets   


 


Giant Platelets   


 


Polychromasia   


 


Hypochromasia (manual)   


 


Poikilocytosis (manual   


 


Anisocytosis (manual)   


 


Ovalocytes   


 


Schistocytes   


 


Retic Count   


 


PT   


 


INR   


 


APTT   


 


Puncture Site   


 


pCO2   


 


pO2   


 


HCO3   


 


ABG pH   


 


ABG Total CO2   


 


ABG O2 Saturation   


 


ABG Base Excess   


 


John Test   


 


ABG Potassium   


 


A-a O2 Difference   


 


Respiratory Index   


 


Glucose   


 


Lactate   


 


Vent Mode   


 


Mechanical Rate   


 


FiO2   


 


Tidal Volume   


 


PEEP   


 


Crit Value Called To   


 


Crit Value Called By   


 


Crit Value Read Back   


 


Blood Gas Notified Time   


 


Sodium   


 


Potassium   


 


Chloride   


 


Carbon Dioxide   


 


Anion Gap   


 


BUN   


 


Creatinine   


 


Est GFR ( Amer)   


 


Est GFR (Non-Af Amer)   


 


POC Glucose (mg/dL)  253 H  231 H  199 H


 


Random Glucose   


 


Hemoglobin A1c   


 


Calcium   


 


Phosphorus   


 


Magnesium   


 


Iron   


 


TIBC   


 


% Saturation   


 


Transferrin   


 


Ferritin   


 


Total Bilirubin   


 


AST   


 


ALT   


 


Alkaline Phosphatase   


 


Total Creatine Kinase   


 


CK-MB (Mass)   


 


Troponin I   


 


NT-Pro-B Natriuret Pep   


 


Total Protein   


 


Albumin   


 


Globulin   


 


Albumin/Globulin Ratio   


 


Triglycerides   


 


Cholesterol   


 


LDL Cholesterol Direct   


 


HDL Cholesterol   


 


Vitamin B12   


 


Folate   


 


Arterial Blood Potassium   


 


Urine Color   


 


Urine Clarity   


 


Urine pH   


 


Ur Specific Gravity   


 


Urine Protein   


 


Urine Glucose (UA)   


 


Urine Ketones   


 


Urine Blood   


 


Urine Nitrate   


 


Urine Bilirubin   


 


Urine Urobilinogen   


 


Ur Leukocyte Esterase   


 


Urine WBC (Auto)   


 


Urine RBC (Auto)   


 


Ur Squamous Epith Cells   


 


Urine Bacteria   


 


Blood Type   


 


Antibody Screen   














  03/26/19 03/26/19 03/26/19





  04:40 05:13 05:50


 


WBC   


 


RBC   


 


Hgb   


 


Hct   


 


MCV   


 


MCH   


 


MCHC   


 


RDW   


 


Plt Count   


 


MPV   


 


Neut % (Auto)   


 


Lymph % (Auto)   


 


Mono % (Auto)   


 


Eos % (Auto)   


 


Baso % (Auto)   


 


Neut # (Auto)   


 


Lymph # (Auto)   


 


Mono # (Auto)   


 


Eos # (Auto)   


 


Baso # (Auto)   


 


Neutrophils % (Manual)   


 


Band Neutrophils %   


 


Lymphocytes % (Manual)   


 


Monocytes % (Manual)   


 


Nucleated RBC %   


 


Differential Comment   


 


Platelet Estimate   


 


Large Platelets   


 


Giant Platelets   


 


Polychromasia   


 


Hypochromasia (manual)   


 


Poikilocytosis (manual   


 


Anisocytosis (manual)   


 


Ovalocytes   


 


Schistocytes   


 


Retic Count   


 


PT   


 


INR   


 


APTT   


 


Puncture Site   R brac 


 


pCO2   36 


 


pO2   290 H 


 


HCO3   14.8 L 


 


ABG pH   7.20 L 


 


ABG Total CO2   15.2 L 


 


ABG O2 Saturation   99.7 H 


 


ABG Base Excess   -13.1 L 


 


John Test   Na 


 


ABG Potassium   4.1 


 


A-a O2 Difference   164.0 


 


Respiratory Index   0.6 


 


Glucose   131 H 


 


Lactate   17.6 H* 


 


Vent Mode   Prvc 


 


Mechanical Rate   24 


 


FiO2   70.0 


 


Tidal Volume   500 


 


PEEP   5 


 


Crit Value Called To   Tamara mccarthy rn 


 


Crit Value Called By   Pily peralta rt 


 


Crit Value Read Back   Y 


 


Blood Gas Notified Time   615 


 


Sodium   141.0  140


 


Potassium    3.5 L


 


Chloride   99.0  91 L


 


Carbon Dioxide    27


 


Anion Gap    25 H


 


BUN    26 H


 


Creatinine    8.9 H*


 


Est GFR ( Amer)    7


 


Est GFR (Non-Af Amer)    6


 


POC Glucose (mg/dL)  151 H  


 


Random Glucose    134 H D


 


Hemoglobin A1c   


 


Calcium    8.8


 


Phosphorus    5.7 H


 


Magnesium    2.4 H


 


Iron   


 


TIBC   


 


% Saturation   


 


Transferrin   


 


Ferritin    8070.0


 


Total Bilirubin    0.5


 


AST    590 H


 


ALT    377 H


 


Alkaline Phosphatase    64


 


Total Creatine Kinase   


 


CK-MB (Mass)   


 


Troponin I   


 


NT-Pro-B Natriuret Pep   


 


Total Protein    7.3


 


Albumin    4.4


 


Globulin    2.9


 


Albumin/Globulin Ratio    1.5


 


Triglycerides    274 H


 


Cholesterol    142


 


LDL Cholesterol Direct    63


 


HDL Cholesterol    23 L


 


Vitamin B12    781


 


Folate    6.3


 


Arterial Blood Potassium   4.1 


 


Urine Color   


 


Urine Clarity   


 


Urine pH   


 


Ur Specific Gravity   


 


Urine Protein   


 


Urine Glucose (UA)   


 


Urine Ketones   


 


Urine Blood   


 


Urine Nitrate   


 


Urine Bilirubin   


 


Urine Urobilinogen   


 


Ur Leukocyte Esterase   


 


Urine WBC (Auto)   


 


Urine RBC (Auto)   


 


Ur Squamous Epith Cells   


 


Urine Bacteria   


 


Blood Type   


 


Antibody Screen   














  03/26/19 03/26/19 03/26/19





  05:50 05:50 05:51


 


WBC    19.6 H


 


RBC    3.42 L


 


Hgb    9.6 L


 


Hct    30.7 L


 


MCV    89.9  D


 


MCH    28.1


 


MCHC    31.2 L


 


RDW    17.0 H


 


Plt Count    183


 


MPV    9.0


 


Neut % (Auto)    87.7 H


 


Lymph % (Auto)    7.2 L


 


Mono % (Auto)    5.0


 


Eos % (Auto)    0.0


 


Baso % (Auto)    0.1


 


Neut # (Auto)    17.2 H


 


Lymph # (Auto)    1.4


 


Mono # (Auto)    1.0 H


 


Eos # (Auto)    0.0


 


Baso # (Auto)    0.0


 


Neutrophils % (Manual)    74


 


Band Neutrophils %    11 H*


 


Lymphocytes % (Manual)    8 L


 


Monocytes % (Manual)    7


 


Nucleated RBC %    1 H


 


Differential Comment   


 


Platelet Estimate    Normal


 


Large Platelets    Present


 


Giant Platelets    Present


 


Polychromasia   


 


Hypochromasia (manual)    Slight


 


Poikilocytosis (manual    Slight


 


Anisocytosis (manual)    Slight


 


Ovalocytes    Moderate


 


Schistocytes    Slight


 


Retic Count   


 


PT   


 


INR   


 


APTT   


 


Puncture Site   


 


pCO2   


 


pO2   


 


HCO3   


 


ABG pH   


 


ABG Total CO2   


 


ABG O2 Saturation   


 


ABG Base Excess   


 


John Test   


 


ABG Potassium   


 


A-a O2 Difference   


 


Respiratory Index   


 


Glucose   


 


Lactate   


 


Vent Mode   


 


Mechanical Rate   


 


FiO2   


 


Tidal Volume   


 


PEEP   


 


Crit Value Called To   


 


Crit Value Called By   


 


Crit Value Read Back   


 


Blood Gas Notified Time   


 


Sodium   


 


Potassium   


 


Chloride   


 


Carbon Dioxide   


 


Anion Gap   


 


BUN   


 


Creatinine   


 


Est GFR ( Amer)   


 


Est GFR (Non-Af Amer)   


 


POC Glucose (mg/dL)   


 


Random Glucose   


 


Hemoglobin A1c   


 


Calcium   


 


Phosphorus   


 


Magnesium   


 


Iron  46 L  


 


TIBC  210 L  


 


% Saturation  22  


 


Transferrin   150.39 L 


 


Ferritin   


 


Total Bilirubin   


 


AST   


 


ALT   


 


Alkaline Phosphatase   


 


Total Creatine Kinase   


 


CK-MB (Mass)   


 


Troponin I   


 


NT-Pro-B Natriuret Pep   


 


Total Protein   


 


Albumin   


 


Globulin   


 


Albumin/Globulin Ratio   


 


Triglycerides   


 


Cholesterol   


 


LDL Cholesterol Direct   


 


HDL Cholesterol   


 


Vitamin B12   


 


Folate   


 


Arterial Blood Potassium   


 


Urine Color   


 


Urine Clarity   


 


Urine pH   


 


Ur Specific Gravity   


 


Urine Protein   


 


Urine Glucose (UA)   


 


Urine Ketones   


 


Urine Blood   


 


Urine Nitrate   


 


Urine Bilirubin   


 


Urine Urobilinogen   


 


Ur Leukocyte Esterase   


 


Urine WBC (Auto)   


 


Urine RBC (Auto)   


 


Ur Squamous Epith Cells   


 


Urine Bacteria   


 


Blood Type   


 


Antibody Screen   














  03/26/19 03/26/19 03/26/19





  05:51 05:51 05:51


 


WBC   


 


RBC   


 


Hgb   


 


Hct   


 


MCV   


 


MCH   


 


MCHC   


 


RDW   


 


Plt Count   


 


MPV   


 


Neut % (Auto)   


 


Lymph % (Auto)   


 


Mono % (Auto)   


 


Eos % (Auto)   


 


Baso % (Auto)   


 


Neut # (Auto)   


 


Lymph # (Auto)   


 


Mono # (Auto)   


 


Eos # (Auto)   


 


Baso # (Auto)   


 


Neutrophils % (Manual)   


 


Band Neutrophils %   


 


Lymphocytes % (Manual)   


 


Monocytes % (Manual)   


 


Nucleated RBC %   


 


Differential Comment   


 


Platelet Estimate   


 


Large Platelets   


 


Giant Platelets   


 


Polychromasia   


 


Hypochromasia (manual)   


 


Poikilocytosis (manual   


 


Anisocytosis (manual)   


 


Ovalocytes   


 


Schistocytes   


 


Retic Count    1.8 H


 


PT   


 


INR   


 


APTT  41 H  


 


Puncture Site   


 


pCO2   


 


pO2   


 


HCO3   


 


ABG pH   


 


ABG Total CO2   


 


ABG O2 Saturation   


 


ABG Base Excess   


 


John Test   


 


ABG Potassium   


 


A-a O2 Difference   


 


Respiratory Index   


 


Glucose   


 


Lactate   


 


Vent Mode   


 


Mechanical Rate   


 


FiO2   


 


Tidal Volume   


 


PEEP   


 


Crit Value Called To   


 


Crit Value Called By   


 


Crit Value Read Back   


 


Blood Gas Notified Time   


 


Sodium   


 


Potassium   


 


Chloride   


 


Carbon Dioxide   


 


Anion Gap   


 


BUN   


 


Creatinine   


 


Est GFR ( Amer)   


 


Est GFR (Non-Af Amer)   


 


POC Glucose (mg/dL)   


 


Random Glucose   


 


Hemoglobin A1c   6.2 


 


Calcium   


 


Phosphorus   


 


Magnesium   


 


Iron   


 


TIBC   


 


% Saturation   


 


Transferrin   


 


Ferritin   


 


Total Bilirubin   


 


AST   


 


ALT   


 


Alkaline Phosphatase   


 


Total Creatine Kinase   


 


CK-MB (Mass)   


 


Troponin I   


 


NT-Pro-B Natriuret Pep   


 


Total Protein   


 


Albumin   


 


Globulin   


 


Albumin/Globulin Ratio   


 


Triglycerides   


 


Cholesterol   


 


LDL Cholesterol Direct   


 


HDL Cholesterol   


 


Vitamin B12   


 


Folate   


 


Arterial Blood Potassium   


 


Urine Color   


 


Urine Clarity   


 


Urine pH   


 


Ur Specific Gravity   


 


Urine Protein   


 


Urine Glucose (UA)   


 


Urine Ketones   


 


Urine Blood   


 


Urine Nitrate   


 


Urine Bilirubin   


 


Urine Urobilinogen   


 


Ur Leukocyte Esterase   


 


Urine WBC (Auto)   


 


Urine RBC (Auto)   


 


Ur Squamous Epith Cells   


 


Urine Bacteria   


 


Blood Type   


 


Antibody Screen   














Assessment & Plan


(1) Cardiac arrest


Status: Acute   





(2) CKD (chronic kidney disease) requiring chronic dialysis


Status: Acute

## 2019-03-26 NOTE — CP.PCM.CON
History of Present Illness





- History of Present Illness


History of Present Illness: 





Palliative consult requested for goals of care discussion re poor prognosis





Patient is a 62 yo male admitted from HD S/P cardiac arrest. Patient was eating 

protein bar when he arrested. No pulses were detected.Epi X 6 given by EMS plus 

patient was shocked X 1 and intubated on the field. Upon arrival to ED CPR was 

still in progress. 





Upon arrival patient diagnosed with metabolic and respiratory acidosis. Lactate 

17.6. BiCarbs drip on board. Troponins elevated X 2. Since admission patient 

Coded X 5. Last Code Blue was around 10 am this morning. 





Prognosis is seen as grave. Palliative care is consulted to support patient's 

significant other during this difficult time and assist with decision making.





PMH: DM, ESRD on HD, HDL





Soc. Hx: parents , has no family member, single, lives with significant 

other Mr. Oli Sánchez for the last 30 years





Fam. Hx: unknown











Review of Systems





- Review of Systems


All systems: reviewed and no additional remarkable complaints except


Review of Systems: 





ROS unobtainable from patient due to acuity of condition. Per nursing patient is

on double pressors for BP support.





Past Patient History





- Past Medical History & Family History


Past Medical History?: Yes





- Past Social History


Smoking Status: Unknown If Ever Smoked





- CARDIAC


Hx Hypercholesterolemia: Yes


Hx Hypertension: Yes


Other/Comment: Hx of CAD, coronary stents x 3 - placed in 2018





- PULMONARY


Hx Respiratory Disorders: No





- NEUROLOGICAL


Hx Neurological Disorder: No





- HEENT


Hx Blind: Yes (Both eyes)





- RENAL


Hx Chronic Kidney Disease: Yes


Hx Dialysis: Yes (Started in 2018)


Type of Dialysis Access: Left arm fistula


Date of Last Dialysis Treatment: 19





- ENDOCRINE/METABOLIC


Hx Diabetes Mellitus Type 2: Yes





- HEMATOLOGICAL/ONCOLOGICAL


Hx Human Immunodeficiency Virus (HIV): Yes





- INTEGUMENTARY


Hx Dermatological Problems: No





- MUSCULOSKELETAL/RHEUMATOLOGICAL


Hx Musculoskeletal Disorders: No


Hx Falls: No





- GASTROINTESTINAL


Hx Gastrointestinal Disorders: No





- GENITOURINARY/GYNECOLOGICAL


Hx Genitourinary Disorders: No





- PSYCHIATRIC


Hx Substance Use: No (UNKNOWN)





- SURGICAL HISTORY


Hx Surgeries:  (UNKNOWN)





- ANESTHESIA


Hx Anesthesia: No (UNKNOWN)





Meds


Allergies/Adverse Reactions: 


                                    Allergies











Allergy/AdvReac Type Severity Reaction Status Date / Time


 


Peanut butter Allergy  RASH Uncoded 19 19:34














- Medications


Medications: 


                               Current Medications





Albuterol/Ipratropium (Duoneb 3 Mg/0.5 Mg (3 Ml) Ud)  3 ml INH RQ4 [facility]


   Last Admin: 19 07:44 Dose:  3 ml


Artificial Tears (Lacri-Lube)  1 gm OU Q4H [facility]


   Last Admin: 19 08:00 Dose:  1 gm


Calcium Acetate (Phoslo)  667 mg PO DAILY [facility]


   Last Admin: 19 09:13 Dose:  Not Given


Ezetimibe (Zetia)  10 mg PO HS [facility]


   Last Admin: 19 21:44 Dose:  Not Given


Famotidine (Pepcid)  20 mg PO DAILY [facility]


   Last Admin: 19 09:13 Dose:  Not Given


Gabapentin (Neurontin)  100 mg PO HS [facility]


   Last Admin: 19 21:44 Dose:  Not Given


Heparin Sodium (Porcine) (Heparin)  5,000 units SC Q8 [facility]


   Last Admin: 19 05:48 Dose:  5,000 units


Home Med (Patient's Own Medication)  1 tab PO DAILY [facility]


Norepinephrine Bitartrate 4 mg (/ Sodium Chloride)  254 mls @ 15.24 mls/hr IV 

.[id number] PRN; Protocol


   PRN Reason: TITRATE PER MD ORDER


   Last Admin: 19 09:12 Dose:  10 mcg/min, 38.1 mls/hr


Dopamine HCl/Dextrose (Dopamine 400mg/250ml D5w)  400 mg in 250 mls @ 7.032 m

ls/hr IV .Q24H PRN; Protocol


   PRN Reason: TITRATE PER MD ORDER


   Last Admin: 19 08:50 Dose:  20 mcg/kg/min, 70.319 mls/hr


Vasopressin 40 units/ Dextrose  42 mls @ 0.63 mls/hr IV .Q24H [facility]; Protocol


   Last Admin: 19 09:48 Dose:  0.01 units/min, 0.63 mls/hr


Sodium Bicarbonate 150 meq/ (Dextrose)  1,150 mls @ 60 mls/hr IV .[id number] [facility]


   Last Admin: 19 08:15 Dose:  60 mls/hr


Epinephrine HCl 1 mg/ Sodium (Chloride)  251 mls @ 15.06 mls/hr IV .[id number] PRN;

Protocol


   PRN Reason: TITRATE PER MD ORDER


   Last Admin: 19 10:14 Dose:  3 mcg/min, 45.18 mls/hr


Cefepime HCl (Maxipime Iv 1 Gm Premix)  1 gm in 50 mls @ 100 mls/hr IVPB Q24H 

[facility]; Protocol


Insulin Human Regular (Novolin R)  0 unit SC Q1H [facility]; Protocol


   Last Admin: 19 10:33 Dose:  Not Given


Meperidine HCl (Demerol)  25 mg IVP Q30M PRN


   PRN Reason: Rigors


Potassium Chloride (Potassium Chloride Oral Soln)  20 meq PO DAILY [facility]


   Last Admin: 19 09:14 Dose:  Not Given


Raltegravir (Isentress)  400 mg PO Q12H [facility]; Protocol











Physical Exam





- Constitutional


Appears: In Acute Distress





- Head Exam


Head Exam: ATRAUMATIC, NORMAL INSPECTION, NORMOCEPHALIC





- Eye Exam


Eye Exam: Normal appearance


Pupil Exam: Fixed





- ENT Exam


Additional comments: 





ETT, NGT drainage bloody





- Neck Exam


Neck exam: Positive for: Normal Inspection





- Respiratory Exam


Additional comments: 





On MV, no spontaneous breaths





- Cardiovascular Exam


Cardiovascular Exam: Tachycardia, Irregular Rhythm





- GI/Abdominal Exam


GI & Abdominal Exam: Distended, Hypoactive Bowel Sounds





- Rectal Exam


Rectal Exam: Deferred





-  Exam


Additional comments: 





Handy cath in





- Extremities Exam


Extremities exam: Positive for: pedal edema





- Back Exam


Back exam: NORMAL INSPECTION





- Neurological Exam


Neurological exam: Motor Sensory Deficit





- Psychiatric Exam


Psychiatric exam: Flat Affect





- Skin


Skin Exam: Diaphoretic, Intact





Results





- Vital Signs


Recent Vital Signs: 


                                Last Vital Signs











Temp  91.6 F L  19 08:00


 


Pulse  86   19 11:19


 


Resp  16   19 11:19


 


BP  162/96 H  19 11:19


 


Pulse Ox  96   19 11:04














- Labs


Result Diagrams: 


                                 19 05:51





                                 19 05:50


Labs: 


                         Laboratory Results - last 24 hr











  19





  11:04 11:26 14:44


 


WBC    15.2 H


 


RBC    3.59 L


 


Hgb    10.1 L


 


Hct    32.1 L


 


MCV    89.3  D


 


MCH    28.1


 


MCHC    31.5 L


 


RDW    17.0 H


 


Plt Count    178


 


MPV    8.8


 


Neut % (Auto)    84.4 H


 


Lymph % (Auto)    7.4 L


 


Mono % (Auto)    7.7


 


Eos % (Auto)    0.2


 


Baso % (Auto)    0.3


 


Neut # (Auto)    12.9 H


 


Lymph # (Auto)    1.1


 


Mono # (Auto)    1.2 H


 


Eos # (Auto)    0.0


 


Baso # (Auto)    0.0


 


Neutrophils % (Manual)    88 H


 


Band Neutrophils %    2


 


Lymphocytes % (Manual)    7 L


 


Monocytes % (Manual)    3


 


Nucleated RBC %   


 


Platelet Estimate    Normal


 


Large Platelets   


 


Giant Platelets   


 


Polychromasia    Slight


 


Hypochromasia (manual)    Slight


 


Poikilocytosis (manual   


 


Anisocytosis (manual)    Slight


 


Ovalocytes    Slight


 


Schistocytes   


 


Retic Count   


 


PT   


 


INR   


 


APTT   


 


Puncture Site   


 


pCO2   


 


pO2   


 


HCO3   


 


ABG pH   


 


ABG Total CO2   


 


ABG O2 Saturation   


 


ABG Base Excess   


 


John Test   


 


ABG Potassium   


 


A-a O2 Difference   


 


Respiratory Index   


 


Glucose   


 


Lactate   


 


Vent Mode   


 


Mechanical Rate   


 


FiO2   


 


Tidal Volume   


 


PEEP   


 


Crit Value Called To   


 


Crit Value Called By   


 


Crit Value Read Back   


 


Blood Gas Notified Time   


 


Sodium   


 


Potassium   


 


Chloride   


 


Carbon Dioxide   


 


Anion Gap   


 


BUN   


 


Creatinine   


 


Est GFR ( Amer)   


 


Est GFR (Non-Af Amer)   


 


POC Glucose (mg/dL)   


 


Random Glucose   


 


Hemoglobin A1c   


 


Calcium   


 


Phosphorus   


 


Magnesium   


 


Iron   


 


TIBC   


 


% Saturation   


 


Transferrin   


 


Ferritin   


 


Total Bilirubin   


 


AST   


 


ALT   


 


Alkaline Phosphatase   


 


Total Creatine Kinase   


 


CK-MB (Mass)   


 


Troponin I  0.2240 H*  


 


Total Protein   


 


Albumin   


 


Globulin   


 


Albumin/Globulin Ratio   


 


Triglycerides   


 


Cholesterol   


 


LDL Cholesterol Direct   


 


HDL Cholesterol   


 


Vitamin B12   


 


Folate   


 


Arterial Blood Potassium   


 


Urine Color   


 


Urine Clarity   


 


Urine pH   


 


Ur Specific Gravity   


 


Urine Protein   


 


Urine Glucose (UA)   


 


Urine Ketones   


 


Urine Blood   


 


Urine Nitrate   


 


Urine Bilirubin   


 


Urine Urobilinogen   


 


Ur Leukocyte Esterase   


 


Urine WBC (Auto)   


 


Urine RBC (Auto)   


 


Ur Squamous Epith Cells   


 


Urine Bacteria   


 


Influenza Typ A,B (EIA)   


 


Blood Type   A POSITIVE 


 


Antibody Screen   Negative 














  19





  14:44 14:44 16:40


 


WBC   


 


RBC   


 


Hgb   


 


Hct   


 


MCV   


 


MCH   


 


MCHC   


 


RDW   


 


Plt Count   


 


MPV   


 


Neut % (Auto)   


 


Lymph % (Auto)   


 


Mono % (Auto)   


 


Eos % (Auto)   


 


Baso % (Auto)   


 


Neut # (Auto)   


 


Lymph # (Auto)   


 


Mono # (Auto)   


 


Eos # (Auto)   


 


Baso # (Auto)   


 


Neutrophils % (Manual)   


 


Band Neutrophils %   


 


Lymphocytes % (Manual)   


 


Monocytes % (Manual)   


 


Nucleated RBC %   


 


Platelet Estimate   


 


Large Platelets   


 


Giant Platelets   


 


Polychromasia   


 


Hypochromasia (manual)   


 


Poikilocytosis (manual   


 


Anisocytosis (manual)   


 


Ovalocytes   


 


Schistocytes   


 


Retic Count   


 


PT   


 


INR   


 


APTT   


 


Puncture Site   


 


pCO2   


 


pO2   


 


HCO3   


 


ABG pH   


 


ABG Total CO2   


 


ABG O2 Saturation   


 


ABG Base Excess   


 


John Test   


 


ABG Potassium   


 


A-a O2 Difference   


 


Respiratory Index   


 


Glucose   


 


Lactate   


 


Vent Mode   


 


Mechanical Rate   


 


FiO2   


 


Tidal Volume   


 


PEEP   


 


Crit Value Called To   


 


Crit Value Called By   


 


Crit Value Read Back   


 


Blood Gas Notified Time   


 


Sodium  138  


 


Potassium  3.5 L  


 


Chloride  92 L  


 


Carbon Dioxide  25  


 


Anion Gap  24 H  


 


BUN  18  


 


Creatinine  7.9 H*  


 


Est GFR ( Amer)  8  


 


Est GFR (Non-Af Amer)  7  


 


POC Glucose (mg/dL)   


 


Random Glucose  282 H  


 


Hemoglobin A1c   


 


Calcium  9.4  


 


Phosphorus  4.0  


 


Magnesium  2.4 H  


 


Iron   


 


TIBC   


 


% Saturation   


 


Transferrin   


 


Ferritin   


 


Total Bilirubin  0.7  


 


AST  527 H D  


 


ALT  446 H D  


 


Alkaline Phosphatase  74  


 


Total Creatine Kinase    3962 H


 


CK-MB (Mass)    103 H


 


Troponin I    61.5000 H*


 


Total Protein  7.5  


 


Albumin  4.5  


 


Globulin  3.0  


 


Albumin/Globulin Ratio  1.5  


 


Triglycerides   


 


Cholesterol   


 


LDL Cholesterol Direct   


 


HDL Cholesterol   


 


Vitamin B12   


 


Folate   


 


Arterial Blood Potassium   


 


Urine Color   Yellow 


 


Urine Clarity   Hazy 


 


Urine pH   5.0 


 


Ur Specific Gravity   1.023 


 


Urine Protein   1+ H 


 


Urine Glucose (UA)   Normal 


 


Urine Ketones   Negative 


 


Urine Blood   Negative 


 


Urine Nitrate   Negative 


 


Urine Bilirubin   Negative 


 


Urine Urobilinogen   Normal 


 


Ur Leukocyte Esterase   Neg 


 


Urine WBC (Auto)   2 


 


Urine RBC (Auto)   3 


 


Ur Squamous Epith Cells   < 1 


 


Urine Bacteria   Rare 


 


Influenza Typ A,B (EIA)   


 


Blood Type   


 


Antibody Screen   














  19





  19:30 22:15 22:15


 


WBC   20.8 H 


 


RBC   3.36 L 


 


Hgb   9.5 L 


 


Hct   31.0 L 


 


MCV   92.4  D 


 


MCH   28.2 


 


MCHC   30.6 L 


 


RDW   17.0 H 


 


Plt Count   181 


 


MPV   9.2 


 


Neut % (Auto)   91.2 H 


 


Lymph % (Auto)   3.8 L 


 


Mono % (Auto)   4.8 


 


Eos % (Auto)   0.1 


 


Baso % (Auto)   0.1 


 


Neut # (Auto)   18.9 H 


 


Lymph # (Auto)   0.8 L 


 


Mono # (Auto)   1.0 H 


 


Eos # (Auto)   0.0 


 


Baso # (Auto)   0.0 


 


Neutrophils % (Manual)   88 H 


 


Band Neutrophils %   5 H 


 


Lymphocytes % (Manual)   2 L 


 


Monocytes % (Manual)   5 


 


Nucleated RBC %   


 


Platelet Estimate   Normal 


 


Large Platelets   


 


Giant Platelets   


 


Polychromasia   


 


Hypochromasia (manual)   


 


Poikilocytosis (manual   


 


Anisocytosis (manual)   


 


Ovalocytes   


 


Schistocytes   


 


Retic Count   


 


PT    13.7 H


 


INR    1.3


 


APTT    40 H D


 


Puncture Site  Rba  


 


pCO2  43  


 


pO2  285 H  


 


HCO3  15.3 L  


 


ABG pH  7.17 L*  


 


ABG Total CO2  17.0 L  


 


ABG O2 Saturation  99.5 H  


 


ABG Base Excess  -12.4 L  


 


John Test  Pos  


 


ABG Potassium  3.7  


 


A-a O2 Difference  374.0  


 


Respiratory Index  1.3  


 


Glucose  247 H  


 


Lactate  15.6 H*  


 


Vent Mode  Prvc  


 


Mechanical Rate  20  


 


FiO2  100.0  


 


Tidal Volume  500  


 


PEEP  5  


 


Crit Value Called To  Dr quan  


 


Crit Value Called By  [facility]  


 


Crit Value Read Back  Y  


 


Blood Gas Notified Time    


 


Sodium  142.0  


 


Potassium   


 


Chloride  100.0  


 


Carbon Dioxide   


 


Anion Gap   


 


BUN   


 


Creatinine   


 


Est GFR ( Amer)   


 


Est GFR (Non-Af Amer)   


 


POC Glucose (mg/dL)   


 


Random Glucose   


 


Hemoglobin A1c   


 


Calcium   


 


Phosphorus   


 


Magnesium   


 


Iron   


 


TIBC   


 


% Saturation   


 


Transferrin   


 


Ferritin   


 


Total Bilirubin   


 


AST   


 


ALT   


 


Alkaline Phosphatase   


 


Total Creatine Kinase   


 


CK-MB (Mass)   


 


Troponin I   


 


Total Protein   


 


Albumin   


 


Globulin   


 


Albumin/Globulin Ratio   


 


Triglycerides   


 


Cholesterol   


 


LDL Cholesterol Direct   


 


HDL Cholesterol   


 


Vitamin B12   


 


Folate   


 


Arterial Blood Potassium  3.7  


 


Urine Color   


 


Urine Clarity   


 


Urine pH   


 


Ur Specific Gravity   


 


Urine Protein   


 


Urine Glucose (UA)   


 


Urine Ketones   


 


Urine Blood   


 


Urine Nitrate   


 


Urine Bilirubin   


 


Urine Urobilinogen   


 


Ur Leukocyte Esterase   


 


Urine WBC (Auto)   


 


Urine RBC (Auto)   


 


Ur Squamous Epith Cells   


 


Urine Bacteria   


 


Influenza Typ A,B (EIA)   


 


Blood Type   


 


Antibody Screen   














  19





  22:15 22:15 00:04


 


WBC   


 


RBC   


 


Hgb   


 


Hct   


 


MCV   


 


MCH   


 


MCHC   


 


RDW   


 


Plt Count   


 


MPV   


 


Neut % (Auto)   


 


Lymph % (Auto)   


 


Mono % (Auto)   


 


Eos % (Auto)   


 


Baso % (Auto)   


 


Neut # (Auto)   


 


Lymph # (Auto)   


 


Mono # (Auto)   


 


Eos # (Auto)   


 


Baso # (Auto)   


 


Neutrophils % (Manual)   


 


Band Neutrophils %   


 


Lymphocytes % (Manual)   


 


Monocytes % (Manual)   


 


Nucleated RBC %   


 


Platelet Estimate   


 


Large Platelets   


 


Giant Platelets   


 


Polychromasia   


 


Hypochromasia (manual)   


 


Poikilocytosis (manual   


 


Anisocytosis (manual)   


 


Ovalocytes   


 


Schistocytes   


 


Retic Count   


 


PT   


 


INR   


 


APTT   


 


Puncture Site   


 


pCO2   


 


pO2   


 


HCO3   


 


ABG pH   


 


ABG Total CO2   


 


ABG O2 Saturation   


 


ABG Base Excess   


 


John Test   


 


ABG Potassium   


 


A-a O2 Difference   


 


Respiratory Index   


 


Glucose   


 


Lactate   


 


Vent Mode   


 


Mechanical Rate   


 


FiO2   


 


Tidal Volume   


 


PEEP   


 


Crit Value Called To   


 


Crit Value Called By   


 


Crit Value Read Back   


 


Blood Gas Notified Time   


 


Sodium  140  


 


Potassium  3.4 L  


 


Chloride  93 L  


 


Carbon Dioxide  19 L  


 


Anion Gap  30 H  


 


BUN  21 H  


 


Creatinine  8.7 H*  


 


Est GFR ( Amer)  8  


 


Est GFR (Non-Af Amer)  6  


 


POC Glucose (mg/dL)    260 H


 


Random Glucose  240 H  


 


Hemoglobin A1c   


 


Calcium  9.1  


 


Phosphorus  6.9 H  


 


Magnesium  2.6 H  


 


Iron   


 


TIBC   


 


% Saturation   


 


Transferrin   


 


Ferritin   


 


Total Bilirubin   


 


AST   


 


ALT   


 


Alkaline Phosphatase   


 


Total Creatine Kinase   5933 H 


 


CK-MB (Mass)   168 H 


 


Troponin I   107.0000 H* 


 


Total Protein   


 


Albumin   


 


Globulin   


 


Albumin/Globulin Ratio   


 


Triglycerides   


 


Cholesterol   


 


LDL Cholesterol Direct   


 


HDL Cholesterol   


 


Vitamin B12   


 


Folate   


 


Arterial Blood Potassium   


 


Urine Color   


 


Urine Clarity   


 


Urine pH   


 


Ur Specific Gravity   


 


Urine Protein   


 


Urine Glucose (UA)   


 


Urine Ketones   


 


Urine Blood   


 


Urine Nitrate   


 


Urine Bilirubin   


 


Urine Urobilinogen   


 


Ur Leukocyte Esterase   


 


Urine WBC (Auto)   


 


Urine RBC (Auto)   


 


Ur Squamous Epith Cells   


 


Urine Bacteria   


 


Influenza Typ A,B (EIA)   


 


Blood Type   


 


Antibody Screen   














  19





  01:13 02:20 03:47


 


WBC   


 


RBC   


 


Hgb   


 


Hct   


 


MCV   


 


MCH   


 


MCHC   


 


RDW   


 


Plt Count   


 


MPV   


 


Neut % (Auto)   


 


Lymph % (Auto)   


 


Mono % (Auto)   


 


Eos % (Auto)   


 


Baso % (Auto)   


 


Neut # (Auto)   


 


Lymph # (Auto)   


 


Mono # (Auto)   


 


Eos # (Auto)   


 


Baso # (Auto)   


 


Neutrophils % (Manual)   


 


Band Neutrophils %   


 


Lymphocytes % (Manual)   


 


Monocytes % (Manual)   


 


Nucleated RBC %   


 


Platelet Estimate   


 


Large Platelets   


 


Giant Platelets   


 


Polychromasia   


 


Hypochromasia (manual)   


 


Poikilocytosis (manual   


 


Anisocytosis (manual)   


 


Ovalocytes   


 


Schistocytes   


 


Retic Count   


 


PT   


 


INR   


 


APTT   


 


Puncture Site   


 


pCO2   


 


pO2   


 


HCO3   


 


ABG pH   


 


ABG Total CO2   


 


ABG O2 Saturation   


 


ABG Base Excess   


 


John Test   


 


ABG Potassium   


 


A-a O2 Difference   


 


Respiratory Index   


 


Glucose   


 


Lactate   


 


Vent Mode   


 


Mechanical Rate   


 


FiO2   


 


Tidal Volume   


 


PEEP   


 


Crit Value Called To   


 


Crit Value Called By   


 


Crit Value Read Back   


 


Blood Gas Notified Time   


 


Sodium   


 


Potassium   


 


Chloride   


 


Carbon Dioxide   


 


Anion Gap   


 


BUN   


 


Creatinine   


 


Est GFR ( Amer)   


 


Est GFR (Non-Af Amer)   


 


POC Glucose (mg/dL)  253 H  231 H  199 H


 


Random Glucose   


 


Hemoglobin A1c   


 


Calcium   


 


Phosphorus   


 


Magnesium   


 


Iron   


 


TIBC   


 


% Saturation   


 


Transferrin   


 


Ferritin   


 


Total Bilirubin   


 


AST   


 


ALT   


 


Alkaline Phosphatase   


 


Total Creatine Kinase   


 


CK-MB (Mass)   


 


Troponin I   


 


Total Protein   


 


Albumin   


 


Globulin   


 


Albumin/Globulin Ratio   


 


Triglycerides   


 


Cholesterol   


 


LDL Cholesterol Direct   


 


HDL Cholesterol   


 


Vitamin B12   


 


Folate   


 


Arterial Blood Potassium   


 


Urine Color   


 


Urine Clarity   


 


Urine pH   


 


Ur Specific Gravity   


 


Urine Protein   


 


Urine Glucose (UA)   


 


Urine Ketones   


 


Urine Blood   


 


Urine Nitrate   


 


Urine Bilirubin   


 


Urine Urobilinogen   


 


Ur Leukocyte Esterase   


 


Urine WBC (Auto)   


 


Urine RBC (Auto)   


 


Ur Squamous Epith Cells   


 


Urine Bacteria   


 


Influenza Typ A,B (EIA)   


 


Blood Type   


 


Antibody Screen   














  19





  04:40 05:13 05:50


 


WBC   


 


RBC   


 


Hgb   


 


Hct   


 


MCV   


 


MCH   


 


MCHC   


 


RDW   


 


Plt Count   


 


MPV   


 


Neut % (Auto)   


 


Lymph % (Auto)   


 


Mono % (Auto)   


 


Eos % (Auto)   


 


Baso % (Auto)   


 


Neut # (Auto)   


 


Lymph # (Auto)   


 


Mono # (Auto)   


 


Eos # (Auto)   


 


Baso # (Auto)   


 


Neutrophils % (Manual)   


 


Band Neutrophils %   


 


Lymphocytes % (Manual)   


 


Monocytes % (Manual)   


 


Nucleated RBC %   


 


Platelet Estimate   


 


Large Platelets   


 


Giant Platelets   


 


Polychromasia   


 


Hypochromasia (manual)   


 


Poikilocytosis (manual   


 


Anisocytosis (manual)   


 


Ovalocytes   


 


Schistocytes   


 


Retic Count   


 


PT   


 


INR   


 


APTT   


 


Puncture Site   R brac 


 


pCO2   36 


 


pO2   290 H 


 


HCO3   14.8 L 


 


ABG pH   7.20 L 


 


ABG Total CO2   15.2 L 


 


ABG O2 Saturation   99.7 H 


 


ABG Base Excess   -13.1 L 


 


John Test   Na 


 


ABG Potassium   4.1 


 


A-a O2 Difference   164.0 


 


Respiratory Index   0.6 


 


Glucose   131 H 


 


Lactate   17.6 H* 


 


Vent Mode   Prvc 


 


Mechanical Rate   24 


 


FiO2   70.0 


 


Tidal Volume   500 


 


PEEP   5 


 


Crit Value Called To   Tamara icu rn 


 


Crit Value Called By   Pily peralta rt 


 


Crit Value Read Back   Y 


 


Blood Gas Notified Time   615 


 


Sodium   141.0  140


 


Potassium    3.5 L


 


Chloride   99.0  91 L


 


Carbon Dioxide    27


 


Anion Gap    25 H


 


BUN    26 H


 


Creatinine    8.9 H*


 


Est GFR ( Amer)    7


 


Est GFR (Non-Af Amer)    6


 


POC Glucose (mg/dL)  151 H  


 


Random Glucose    134 H D


 


Hemoglobin A1c   


 


Calcium    8.8


 


Phosphorus    5.7 H


 


Magnesium    2.4 H


 


Iron   


 


TIBC   


 


% Saturation   


 


Transferrin   


 


Ferritin    8070.0


 


Total Bilirubin    0.5


 


AST    590 H


 


ALT    377 H


 


Alkaline Phosphatase    64


 


Total Creatine Kinase   


 


CK-MB (Mass)   


 


Troponin I   


 


Total Protein    7.3


 


Albumin    4.4


 


Globulin    2.9


 


Albumin/Globulin Ratio    1.5


 


Triglycerides    274 H


 


Cholesterol    142


 


LDL Cholesterol Direct    63


 


HDL Cholesterol    23 L


 


Vitamin B12    781


 


Folate    6.3


 


Arterial Blood Potassium   4.1 


 


Urine Color   


 


Urine Clarity   


 


Urine pH   


 


Ur Specific Gravity   


 


Urine Protein   


 


Urine Glucose (UA)   


 


Urine Ketones   


 


Urine Blood   


 


Urine Nitrate   


 


Urine Bilirubin   


 


Urine Urobilinogen   


 


Ur Leukocyte Esterase   


 


Urine WBC (Auto)   


 


Urine RBC (Auto)   


 


Ur Squamous Epith Cells   


 


Urine Bacteria   


 


Influenza Typ A,B (EIA)   


 


Blood Type   


 


Antibody Screen   














  19





  05:50 05:50 05:51


 


WBC    19.6 H


 


RBC    3.42 L


 


Hgb    9.6 L


 


Hct    30.7 L


 


MCV    89.9  D


 


MCH    28.1


 


MCHC    31.2 L


 


RDW    17.0 H


 


Plt Count    183


 


MPV    9.0


 


Neut % (Auto)    87.7 H


 


Lymph % (Auto)    7.2 L


 


Mono % (Auto)    5.0


 


Eos % (Auto)    0.0


 


Baso % (Auto)    0.1


 


Neut # (Auto)    17.2 H


 


Lymph # (Auto)    1.4


 


Mono # (Auto)    1.0 H


 


Eos # (Auto)    0.0


 


Baso # (Auto)    0.0


 


Neutrophils % (Manual)    74


 


Band Neutrophils %    11 H*


 


Lymphocytes % (Manual)    8 L


 


Monocytes % (Manual)    7


 


Nucleated RBC %    1 H


 


Platelet Estimate    Normal


 


Large Platelets    Present


 


Giant Platelets    Present


 


Polychromasia   


 


Hypochromasia (manual)    Slight


 


Poikilocytosis (manual    Slight


 


Anisocytosis (manual)    Slight


 


Ovalocytes    Moderate


 


Schistocytes    Slight


 


Retic Count   


 


PT   


 


INR   


 


APTT   


 


Puncture Site   


 


pCO2   


 


pO2   


 


HCO3   


 


ABG pH   


 


ABG Total CO2   


 


ABG O2 Saturation   


 


ABG Base Excess   


 


John Test   


 


ABG Potassium   


 


A-a O2 Difference   


 


Respiratory Index   


 


Glucose   


 


Lactate   


 


Vent Mode   


 


Mechanical Rate   


 


FiO2   


 


Tidal Volume   


 


PEEP   


 


Crit Value Called To   


 


Crit Value Called By   


 


Crit Value Read Back   


 


Blood Gas Notified Time   


 


Sodium   


 


Potassium   


 


Chloride   


 


Carbon Dioxide   


 


Anion Gap   


 


BUN   


 


Creatinine   


 


Est GFR ( Amer)   


 


Est GFR (Non-Af Amer)   


 


POC Glucose (mg/dL)   


 


Random Glucose   


 


Hemoglobin A1c   


 


Calcium   


 


Phosphorus   


 


Magnesium   


 


Iron  46 L  


 


TIBC  210 L  


 


% Saturation  22  


 


Transferrin   150.39 L 


 


Ferritin   


 


Total Bilirubin   


 


AST   


 


ALT   


 


Alkaline Phosphatase   


 


Total Creatine Kinase   


 


CK-MB (Mass)   


 


Troponin I   


 


Total Protein   


 


Albumin   


 


Globulin   


 


Albumin/Globulin Ratio   


 


Triglycerides   


 


Cholesterol   


 


LDL Cholesterol Direct   


 


HDL Cholesterol   


 


Vitamin B12   


 


Folate   


 


Arterial Blood Potassium   


 


Urine Color   


 


Urine Clarity   


 


Urine pH   


 


Ur Specific Gravity   


 


Urine Protein   


 


Urine Glucose (UA)   


 


Urine Ketones   


 


Urine Blood   


 


Urine Nitrate   


 


Urine Bilirubin   


 


Urine Urobilinogen   


 


Ur Leukocyte Esterase   


 


Urine WBC (Auto)   


 


Urine RBC (Auto)   


 


Ur Squamous Epith Cells   


 


Urine Bacteria   


 


Influenza Typ A,B (EIA)   


 


Blood Type   


 


Antibody Screen   














  19





  05:51 05:51 05:51


 


WBC   


 


RBC   


 


Hgb   


 


Hct   


 


MCV   


 


MCH   


 


MCHC   


 


RDW   


 


Plt Count   


 


MPV   


 


Neut % (Auto)   


 


Lymph % (Auto)   


 


Mono % (Auto)   


 


Eos % (Auto)   


 


Baso % (Auto)   


 


Neut # (Auto)   


 


Lymph # (Auto)   


 


Mono # (Auto)   


 


Eos # (Auto)   


 


Baso # (Auto)   


 


Neutrophils % (Manual)   


 


Band Neutrophils %   


 


Lymphocytes % (Manual)   


 


Monocytes % (Manual)   


 


Nucleated RBC %   


 


Platelet Estimate   


 


Large Platelets   


 


Giant Platelets   


 


Polychromasia   


 


Hypochromasia (manual)   


 


Poikilocytosis (manual   


 


Anisocytosis (manual)   


 


Ovalocytes   


 


Schistocytes   


 


Retic Count    1.8 H


 


PT   


 


INR   


 


APTT  41 H  


 


Puncture Site   


 


pCO2   


 


pO2   


 


HCO3   


 


ABG pH   


 


ABG Total CO2   


 


ABG O2 Saturation   


 


ABG Base Excess   


 


John Test   


 


ABG Potassium   


 


A-a O2 Difference   


 


Respiratory Index   


 


Glucose   


 


Lactate   


 


Vent Mode   


 


Mechanical Rate   


 


FiO2   


 


Tidal Volume   


 


PEEP   


 


Crit Value Called To   


 


Crit Value Called By   


 


Crit Value Read Back   


 


Blood Gas Notified Time   


 


Sodium   


 


Potassium   


 


Chloride   


 


Carbon Dioxide   


 


Anion Gap   


 


BUN   


 


Creatinine   


 


Est GFR ( Amer)   


 


Est GFR (Non-Af Amer)   


 


POC Glucose (mg/dL)   


 


Random Glucose   


 


Hemoglobin A1c   6.2 


 


Calcium   


 


Phosphorus   


 


Magnesium   


 


Iron   


 


TIBC   


 


% Saturation   


 


Transferrin   


 


Ferritin   


 


Total Bilirubin   


 


AST   


 


ALT   


 


Alkaline Phosphatase   


 


Total Creatine Kinase   


 


CK-MB (Mass)   


 


Troponin I   


 


Total Protein   


 


Albumin   


 


Globulin   


 


Albumin/Globulin Ratio   


 


Triglycerides   


 


Cholesterol   


 


LDL Cholesterol Direct   


 


HDL Cholesterol   


 


Vitamin B12   


 


Folate   


 


Arterial Blood Potassium   


 


Urine Color   


 


Urine Clarity   


 


Urine pH   


 


Ur Specific Gravity   


 


Urine Protein   


 


Urine Glucose (UA)   


 


Urine Ketones   


 


Urine Blood   


 


Urine Nitrate   


 


Urine Bilirubin   


 


Urine Urobilinogen   


 


Ur Leukocyte Esterase   


 


Urine WBC (Auto)   


 


Urine RBC (Auto)   


 


Ur Squamous Epith Cells   


 


Urine Bacteria   


 


Influenza Typ A,B (EIA)   


 


Blood Type   


 


Antibody Screen   














  19





  10:21 11:46


 


WBC  


 


RBC  


 


Hgb  


 


Hct  


 


MCV  


 


MCH  


 


MCHC  


 


RDW  


 


Plt Count  


 


MPV  


 


Neut % (Auto)  


 


Lymph % (Auto)  


 


Mono % (Auto)  


 


Eos % (Auto)  


 


Baso % (Auto)  


 


Neut # (Auto)  


 


Lymph # (Auto)  


 


Mono # (Auto)  


 


Eos # (Auto)  


 


Baso # (Auto)  


 


Neutrophils % (Manual)  


 


Band Neutrophils %  


 


Lymphocytes % (Manual)  


 


Monocytes % (Manual)  


 


Nucleated RBC %  


 


Platelet Estimate  


 


Large Platelets  


 


Giant Platelets  


 


Polychromasia  


 


Hypochromasia (manual)  


 


Poikilocytosis (manual  


 


Anisocytosis (manual)  


 


Ovalocytes  


 


Schistocytes  


 


Retic Count  


 


PT  


 


INR  


 


APTT  


 


Puncture Site  Rra 


 


pCO2  39 


 


pO2  260 H 


 


HCO3  8.8 L* 


 


ABG pH  7.01 L* 


 


ABG Total CO2  11.0 L 


 


ABG O2 Saturation  99.9 H 


 


ABG Base Excess  -20.7 L 


 


John Test  Po 


 


ABG Potassium  3.3 L 


 


A-a O2 Difference  404.0 


 


Respiratory Index  1.6 


 


Glucose  


 


Lactate  


 


Vent Mode  Prvc 


 


Mechanical Rate  24 


 


FiO2  100.0 


 


Tidal Volume  500 


 


PEEP  5 


 


Crit Value Called To   


 


Crit Value Called By  Norbert perkins,darcy 


 


Crit Value Read Back  Y 


 


Blood Gas Notified Time  1025 


 


Sodium  144.0 


 


Potassium  


 


Chloride  98.0 


 


Carbon Dioxide  


 


Anion Gap  


 


BUN  


 


Creatinine  


 


Est GFR ( Amer)  


 


Est GFR (Non-Af Amer)  


 


POC Glucose (mg/dL)  


 


Random Glucose  


 


Hemoglobin A1c  


 


Calcium  


 


Phosphorus  


 


Magnesium  


 


Iron  


 


TIBC  


 


% Saturation  


 


Transferrin  


 


Ferritin  


 


Total Bilirubin  


 


AST  


 


ALT  


 


Alkaline Phosphatase  


 


Total Creatine Kinase  


 


CK-MB (Mass)  


 


Troponin I  


 


Total Protein  


 


Albumin  


 


Globulin  


 


Albumin/Globulin Ratio  


 


Triglycerides  


 


Cholesterol  


 


LDL Cholesterol Direct  


 


HDL Cholesterol  


 


Vitamin B12  


 


Folate  


 


Arterial Blood Potassium  3.3 L 


 


Urine Color  


 


Urine Clarity  


 


Urine pH  


 


Ur Specific Gravity  


 


Urine Protein  


 


Urine Glucose (UA)  


 


Urine Ketones  


 


Urine Blood  


 


Urine Nitrate  


 


Urine Bilirubin  


 


Urine Urobilinogen  


 


Ur Leukocyte Esterase  


 


Urine WBC (Auto)  


 


Urine RBC (Auto)  


 


Ur Squamous Epith Cells  


 


Urine Bacteria  


 


Influenza Typ A,B (EIA)   Negative for flu a/b


 


Blood Type  


 


Antibody Screen  














Assessment & Plan





- Assessment and Plan (Free Text)


Assessment: 





Palliative consult


There was no Advance Directive, PPS 10%


I reviewed all medical records, diagnostic studies, examined patient in the bed,

discussed goals of care with his partner.








Patient is unresponsive to tactile/verbal stimuli, pupils fixed, corneal reflex 

absent, there is no cough reflex.


Skin is diaphoretic, intact.


Respirations supported by MV, O2Sat 96 %, cough reflex absent.


Abdomen, distended, NGT drains maroon color gastric content, hypoactive bowel 

sounds.


Handy cath at bed side, urine output minimal, Crt 8.9, last HD yesterday.


There is no active ROM to upper nor lower extremities.





/96, levophed and Vasopressin on board.


WBC 19.6, Band neutrofils 11, Vanco and Genta IV on board.





Patient's partner Mr. Baird at bed side. Patient was just Coded last time at 

10 am this morning. Mr. Baird has been at bedside since last night. He admits 

being in disbelief about what happened. per him, patient was in his normal state

of being before HD with mild cough only for the last few days. Mr. Baird 

stated he had a chance to talk to the Doctors and understands that patient's 

condition was very complex with poor outcome expected. 





I reviewed patient's latest clinical status, blood results and therapy provided.

Using very gentle words, I led discussion toward end of life care and supported 

Mr. Baird all along. He cried repeating that he could not let go his partner. 





I further elicited if patient ever had spoken about the death. Mr. Baird 

confirmed it and said that patient used to tell him no to ever allow his life to

be prolonged by life support ( " tubes" how he called it). patient also spoke in

the past that he would like to be buried next to Mr. Baird's mother. 





I shared with Doctor Baird that after speaking with Doctor Hines and medical 

residents , the impression was that patient will never resume meaningfull 

recovery . Mr. Rodriguez cried again. I encouraged him to hold patient's hand and 

talk to him. 





I questioned further aggressive interventions such as CPR. Mr. Rodriguez said that 

despite how difficult it was for him to let go, he just can not take to see his 

partner suffering any further and wanted to be at bed side when the death oc

curs. 





I introduced POLST. mr. Baird choose to continue with all Medical 

interventions, BP support meds, IV antibiotics, but no CPR. I discussed further 

HD as unlikely due to patient's low BP. He understood. Mr. Ca signed DNR and 

agreed that natural death is promoted if patient's heart stops again.





This was shared with ICU team on rounds.








Impression





* GCS 3, S/P cardiac arrest


* Meaningful recovery is not expected


* Metabolic acidosis


* Respiratory acidosis


* Patient's partner at bed side advocates for promotion of natural death


* Spiritual distress among immediate family/frien





Suggestion





* In respect of patient's partner wishes, I would continue pressors and IVF. 

  This will give a partner more time to absorb all that happened so fast


* Would not continue with HD due to low BP


* No further diagnostic studies 


* Allow natural death if heart stops again


* Pastoral care for spiritual support


* Agree with DNR











Palliative care will sign off at this point. Family will need a lot of Spiritual

support at this time.





Advance care planing 60 min.

## 2019-03-26 NOTE — CP.[email or web address].CON
History of Present Illness





- History of Present Illness


History of Present Illness: 





patient seen and examined


consult dictated





esrd


post cardiac arrest


metabolic and resp acidosis


requiring pressors fo bp support


intubated and comatose


access clotted





Called family member


left message on voice mail


needs dialysis today


case discussed with intensivist





Past Patient History





- Past Medical History & Family History


Past Medical History?: Yes





- Past Social History


Smoking Status: Unknown If Ever Smoked





- CARDIAC


Hx Hypercholesterolemia: Yes


Hx Hypertension: Yes


Other/Comment: Hx of CAD, coronary stents x 3 - placed in 2018





- PULMONARY


Hx Respiratory Disorders: No





- NEUROLOGICAL


Hx Neurological Disorder: No





- HEENT


Hx Blind: Yes (Both eyes)





- RENAL


Hx Chronic Kidney Disease: Yes


Hx Dialysis: Yes (Started in March 2018)


Type of Dialysis Access: Left arm fistula


Date of Last Dialysis Treatment: 03/25/19





- ENDOCRINE/METABOLIC


Hx Diabetes Mellitus Type 2: Yes





- HEMATOLOGICAL/ONCOLOGICAL


Hx Human Immunodeficiency Virus (HIV): Yes





- INTEGUMENTARY


Hx Dermatological Problems: No





- MUSCULOSKELETAL/RHEUMATOLOGICAL


Hx Musculoskeletal Disorders: No


Hx Falls: No





- GASTROINTESTINAL


Hx Gastrointestinal Disorders: No





- GENITOURINARY/GYNECOLOGICAL


Hx Genitourinary Disorders: No





- PSYCHIATRIC


Hx Substance Use: No (UNKNOWN)





- SURGICAL HISTORY


Hx Surgeries:  (UNKNOWN)





- ANESTHESIA


Hx Anesthesia: No (UNKNOWN)





Meds


Allergies/Adverse Reactions: 


                                    Allergies











Allergy/AdvReac Type Severity Reaction Status Date / Time


 


Peanut butter Allergy  RASH Uncoded 03/25/19 19:34














- Medications


Medications: 


                               Current Medications





Albuterol/Ipratropium (Duoneb 3 Mg/0.5 Mg (3 Ml) Ud)  3 ml INH RQ4 TANIKA


   Last Admin: 03/26/19 07:44 Dose:  3 ml


Artificial Tears (Lacri-Lube)  1 gm OU Q4H TANIKA


Calcium Acetate (Phoslo)  667 mg PO DAILY TANIKA


Ezetimibe (Zetia)  10 mg PO HS TANIKA


   Last Admin: 03/25/19 21:44 Dose:  Not Given


Famotidine (Pepcid)  20 mg PO DAILY TANIKA


Gabapentin (Neurontin)  100 mg PO HS TANIKA


   Last Admin: 03/25/19 21:44 Dose:  Not Given


Heparin Sodium (Porcine) (Heparin)  5,000 units SC Q8 TANIKA


   Last Admin: 03/26/19 05:48 Dose:  5,000 units


Home Med (Patient's Own Medication)  1 tab PO DAILY TANIKA


Norepinephrine Bitartrate 4 mg (/ Sodium Chloride)  254 mls @ 15.24 mls/hr IV 

.[id number] PRN; Protocol


   PRN Reason: TITRATE PER MD ORDER


   Last Titration: 03/26/19 08:00 Dose:  10 mcg/min, 38.1 mls/hr


Dopamine HCl/Dextrose (Dopamine 400mg/250ml D5w)  400 mg in 250 mls @ 7.032 

mls/hr IV .Q24H PRN; Protocol


   PRN Reason: TITRATE PER MD ORDER


   Last Admin: 03/26/19 08:50 Dose:  20 mcg/kg/min, 70.319 mls/hr


Vasopressin 40 units/ Dextrose  42 mls @ 0.63 mls/hr IV .Q24H TANIKA; Protocol


Sodium Bicarbonate 150 meq/ (Dextrose)  1,150 mls @ 60 mls/hr IV .[id number] TANIKA


   Last Admin: 03/26/19 08:15 Dose:  60 mls/hr


Potassium Chloride (Potassium Chloride 20 Meq/100 Ml)  20 meq in 100 mls @ 50 

mls/hr IVPB ONCE ONE


   Stop: 03/26/19 10:02


Insulin Human Regular (Novolin R)  0 unit SC Q1H TANIKA; Protocol


   Last Admin: 03/26/19 08:16 Dose:  Not Given


Meperidine HCl (Demerol)  25 mg IVP Q30M PRN


   PRN Reason: Rigors


Potassium Chloride (Potassium Chloride Oral Soln)  20 meq PO DAILY TANIKA


Raltegravir (Isentress)  400 mg PO Q12H TANIKA; Protocol











Results





- Vital Signs


Recent Vital Signs: 


                                Last Vital Signs











Temp  91.6 F L  03/26/19 08:00


 


Pulse  76   03/26/19 08:19


 


Resp  24   03/26/19 08:19


 


BP  125/81   03/26/19 08:50


 


Pulse Ox  99   03/26/19 08:19














- Labs


Result Diagrams: 


                                 03/26/19 05:51





                                 03/26/19 05:50


Labs: 


                         Laboratory Results - last 24 hr











  03/25/19 03/25/19 03/25/19





  11:04 11:04 11:04


 


WBC  23.3 H  


 


RBC  3.46 L  


 


Hgb  9.5 L  


 


Hct  32.5 L  


 


MCV  93.8  


 


MCH  27.5  


 


MCHC  29.4 L  


 


RDW  16.7 H  


 


Plt Count  99 L  


 


MPV  8.3  


 


Neut % (Auto)  49.9 L  


 


Lymph % (Auto)  43.3 H  


 


Mono % (Auto)  4.9  


 


Eos % (Auto)  1.5  


 


Baso % (Auto)  0.4  


 


Neut # (Auto)  11.6 H  


 


Lymph # (Auto)  10.1 H  


 


Mono # (Auto)  1.1 H  


 


Eos # (Auto)  0.4  


 


Baso # (Auto)  0.1  


 


Neutrophils % (Manual)   


 


Band Neutrophils %   


 


Lymphocytes % (Manual)   


 


Monocytes % (Manual)   


 


Nucleated RBC %   


 


Differential Comment    


 


Platelet Estimate   


 


Large Platelets   


 


Giant Platelets   


 


Polychromasia   


 


Hypochromasia (manual)   


 


Poikilocytosis (manual   


 


Anisocytosis (manual)   


 


Ovalocytes   


 


Schistocytes   


 


Retic Count   


 


PT   12.5 H 


 


INR   1.1 


 


APTT   45 H 


 


Puncture Site   


 


pCO2   


 


pO2   


 


HCO3   


 


ABG pH   


 


ABG Total CO2   


 


ABG O2 Saturation   


 


ABG Base Excess   


 


John Test   


 


ABG Potassium   


 


A-a O2 Difference   


 


Respiratory Index   


 


Glucose   


 


Lactate   


 


Vent Mode   


 


Mechanical Rate   


 


FiO2   


 


Tidal Volume   


 


PEEP   


 


Crit Value Called To   


 


Crit Value Called By   


 


Crit Value Read Back   


 


Blood Gas Notified Time   


 


Sodium    141


 


Potassium    3.1 L


 


Chloride    95 L


 


Carbon Dioxide    16 L


 


Anion Gap    32 H


 


BUN    13


 


Creatinine    6.6 H


 


Est GFR ( Amer)    10


 


Est GFR (Non-Af Amer)    9


 


POC Glucose (mg/dL)   


 


Random Glucose    304 H


 


Hemoglobin A1c   


 


Calcium    9.6


 


Phosphorus   


 


Magnesium   


 


Iron   


 


TIBC   


 


% Saturation   


 


Transferrin   


 


Total Bilirubin    0.4


 


AST    266 H


 


ALT    335 H


 


Alkaline Phosphatase    49


 


Total Creatine Kinase    277 H


 


CK-MB (Mass)    5.15 H


 


Troponin I    0.2240 H*


 


NT-Pro-B Natriuret Pep    78974 H


 


Total Protein    6.4


 


Albumin    3.9


 


Globulin    2.4


 


Albumin/Globulin Ratio    1.6


 


Triglycerides   


 


Cholesterol   


 


LDL Cholesterol Direct   


 


HDL Cholesterol   


 


Vitamin B12   


 


Folate   


 


Arterial Blood Potassium   


 


Urine Color   


 


Urine Clarity   


 


Urine pH   


 


Ur Specific Gravity   


 


Urine Protein   


 


Urine Glucose (UA)   


 


Urine Ketones   


 


Urine Blood   


 


Urine Nitrate   


 


Urine Bilirubin   


 


Urine Urobilinogen   


 


Ur Leukocyte Esterase   


 


Urine WBC (Auto)   


 


Urine RBC (Auto)   


 


Ur Squamous Epith Cells   


 


Urine Bacteria   


 


Blood Type   


 


Antibody Screen   














  03/25/19 03/25/19 03/25/19





  11:26 11:50 14:44


 


WBC    15.2 H


 


RBC    3.59 L


 


Hgb    10.1 L


 


Hct    32.1 L


 


MCV    89.3  D


 


MCH    28.1


 


MCHC    31.5 L


 


RDW    17.0 H


 


Plt Count    178


 


MPV    8.8


 


Neut % (Auto)    84.4 H


 


Lymph % (Auto)    7.4 L


 


Mono % (Auto)    7.7


 


Eos % (Auto)    0.2


 


Baso % (Auto)    0.3


 


Neut # (Auto)    12.9 H


 


Lymph # (Auto)    1.1


 


Mono # (Auto)    1.2 H


 


Eos # (Auto)    0.0


 


Baso # (Auto)    0.0


 


Neutrophils % (Manual)    88 H


 


Band Neutrophils %    2


 


Lymphocytes % (Manual)    7 L


 


Monocytes % (Manual)    3


 


Nucleated RBC %   


 


Differential Comment   


 


Platelet Estimate    Normal


 


Large Platelets   


 


Giant Platelets   


 


Polychromasia    Slight


 


Hypochromasia (manual)    Slight


 


Poikilocytosis (manual   


 


Anisocytosis (manual)    Slight


 


Ovalocytes    Slight


 


Schistocytes   


 


Retic Count   


 


PT   


 


INR   


 


APTT   


 


Puncture Site   Rb 


 


pCO2   23 L 


 


pO2   267 H 


 


HCO3   6.8 L* 


 


ABG pH   7.03 L* 


 


ABG Total CO2   6.8 L 


 


ABG O2 Saturation   100.6 H 


 


ABG Base Excess   -23.3 L 


 


John Test   Na 


 


ABG Potassium   1.2 L* 


 


A-a O2 Difference   417.0 


 


Respiratory Index   1.6 


 


Glucose   111 H 


 


Lactate   6.0 H* 


 


Vent Mode   Prvc 


 


Mechanical Rate   16 


 


FiO2   100.0 


 


Tidal Volume   500 


 


PEEP   5 


 


Crit Value Called To   T ren do 


 


Crit Value Called By   MOHINI lester rrt 


 


Crit Value Read Back   Y 


 


Blood Gas Notified Time   1200 


 


Sodium   158.0 H 


 


Potassium   


 


Chloride   131.0 H 


 


Carbon Dioxide   


 


Anion Gap   


 


BUN   


 


Creatinine   


 


Est GFR ( Amer)   


 


Est GFR (Non-Af Amer)   


 


POC Glucose (mg/dL)   


 


Random Glucose   


 


Hemoglobin A1c   


 


Calcium   


 


Phosphorus   


 


Magnesium   


 


Iron   


 


TIBC   


 


% Saturation   


 


Transferrin   


 


Total Bilirubin   


 


AST   


 


ALT   


 


Alkaline Phosphatase   


 


Total Creatine Kinase   


 


CK-MB (Mass)   


 


Troponin I   


 


NT-Pro-B Natriuret Pep   


 


Total Protein   


 


Albumin   


 


Globulin   


 


Albumin/Globulin Ratio   


 


Triglycerides   


 


Cholesterol   


 


LDL Cholesterol Direct   


 


HDL Cholesterol   


 


Vitamin B12   


 


Folate   


 


Arterial Blood Potassium   1.2 L* 


 


Urine Color   


 


Urine Clarity   


 


Urine pH   


 


Ur Specific Gravity   


 


Urine Protein   


 


Urine Glucose (UA)   


 


Urine Ketones   


 


Urine Blood   


 


Urine Nitrate   


 


Urine Bilirubin   


 


Urine Urobilinogen   


 


Ur Leukocyte Esterase   


 


Urine WBC (Auto)   


 


Urine RBC (Auto)   


 


Ur Squamous Epith Cells   


 


Urine Bacteria   


 


Blood Type  A POSITIVE  


 


Antibody Screen  Negative  














  03/25/19 03/25/19 03/25/19





  14:44 14:44 16:40


 


WBC   


 


RBC   


 


Hgb   


 


Hct   


 


MCV   


 


MCH   


 


MCHC   


 


RDW   


 


Plt Count   


 


MPV   


 


Neut % (Auto)   


 


Lymph % (Auto)   


 


Mono % (Auto)   


 


Eos % (Auto)   


 


Baso % (Auto)   


 


Neut # (Auto)   


 


Lymph # (Auto)   


 


Mono # (Auto)   


 


Eos # (Auto)   


 


Baso # (Auto)   


 


Neutrophils % (Manual)   


 


Band Neutrophils %   


 


Lymphocytes % (Manual)   


 


Monocytes % (Manual)   


 


Nucleated RBC %   


 


Differential Comment   


 


Platelet Estimate   


 


Large Platelets   


 


Giant Platelets   


 


Polychromasia   


 


Hypochromasia (manual)   


 


Poikilocytosis (manual   


 


Anisocytosis (manual)   


 


Ovalocytes   


 


Schistocytes   


 


Retic Count   


 


PT   


 


INR   


 


APTT   


 


Puncture Site   


 


pCO2   


 


pO2   


 


HCO3   


 


ABG pH   


 


ABG Total CO2   


 


ABG O2 Saturation   


 


ABG Base Excess   


 


John Test   


 


ABG Potassium   


 


A-a O2 Difference   


 


Respiratory Index   


 


Glucose   


 


Lactate   


 


Vent Mode   


 


Mechanical Rate   


 


FiO2   


 


Tidal Volume   


 


PEEP   


 


Crit Value Called To   


 


Crit Value Called By   


 


Crit Value Read Back   


 


Blood Gas Notified Time   


 


Sodium  138  


 


Potassium  3.5 L  


 


Chloride  92 L  


 


Carbon Dioxide  25  


 


Anion Gap  24 H  


 


BUN  18  


 


Creatinine  7.9 H*  


 


Est GFR ( Amer)  8  


 


Est GFR (Non-Af Amer)  7  


 


POC Glucose (mg/dL)   


 


Random Glucose  282 H  


 


Hemoglobin A1c   


 


Calcium  9.4  


 


Phosphorus  4.0  


 


Magnesium  2.4 H  


 


Iron   


 


TIBC   


 


% Saturation   


 


Transferrin   


 


Total Bilirubin  0.7  


 


AST  527 H D  


 


ALT  446 H D  


 


Alkaline Phosphatase  74  


 


Total Creatine Kinase    3962 H


 


CK-MB (Mass)    103 H


 


Troponin I    61.5000 H*


 


NT-Pro-B Natriuret Pep   


 


Total Protein  7.5  


 


Albumin  4.5  


 


Globulin  3.0  


 


Albumin/Globulin Ratio  1.5  


 


Triglycerides   


 


Cholesterol   


 


LDL Cholesterol Direct   


 


HDL Cholesterol   


 


Vitamin B12   


 


Folate   


 


Arterial Blood Potassium   


 


Urine Color   Yellow 


 


Urine Clarity   Hazy 


 


Urine pH   5.0 


 


Ur Specific Gravity   1.023 


 


Urine Protein   1+ H 


 


Urine Glucose (UA)   Normal 


 


Urine Ketones   Negative 


 


Urine Blood   Negative 


 


Urine Nitrate   Negative 


 


Urine Bilirubin   Negative 


 


Urine Urobilinogen   Normal 


 


Ur Leukocyte Esterase   Neg 


 


Urine WBC (Auto)   2 


 


Urine RBC (Auto)   3 


 


Ur Squamous Epith Cells   < 1 


 


Urine Bacteria   Rare 


 


Blood Type   


 


Antibody Screen   














  03/25/19 03/25/19 03/25/19





  19:30 22:15 22:15


 


WBC   20.8 H 


 


RBC   3.36 L 


 


Hgb   9.5 L 


 


Hct   31.0 L 


 


MCV   92.4  D 


 


MCH   28.2 


 


MCHC   30.6 L 


 


RDW   17.0 H 


 


Plt Count   181 


 


MPV   9.2 


 


Neut % (Auto)   91.2 H 


 


Lymph % (Auto)   3.8 L 


 


Mono % (Auto)   4.8 


 


Eos % (Auto)   0.1 


 


Baso % (Auto)   0.1 


 


Neut # (Auto)   18.9 H 


 


Lymph # (Auto)   0.8 L 


 


Mono # (Auto)   1.0 H 


 


Eos # (Auto)   0.0 


 


Baso # (Auto)   0.0 


 


Neutrophils % (Manual)   88 H 


 


Band Neutrophils %   5 H 


 


Lymphocytes % (Manual)   2 L 


 


Monocytes % (Manual)   5 


 


Nucleated RBC %   


 


Differential Comment   


 


Platelet Estimate   Normal 


 


Large Platelets   


 


Giant Platelets   


 


Polychromasia   


 


Hypochromasia (manual)   


 


Poikilocytosis (manual   


 


Anisocytosis (manual)   


 


Ovalocytes   


 


Schistocytes   


 


Retic Count   


 


PT    13.7 H


 


INR    1.3


 


APTT    40 H D


 


Puncture Site  Rba  


 


pCO2  43  


 


pO2  285 H  


 


HCO3  15.3 L  


 


ABG pH  7.17 L*  


 


ABG Total CO2  17.0 L  


 


ABG O2 Saturation  99.5 H  


 


ABG Base Excess  -12.4 L  


 


John Test  Pos  


 


ABG Potassium  3.7  


 


A-a O2 Difference  374.0  


 


Respiratory Index  1.3  


 


Glucose  247 H  


 


Lactate  15.6 H*  


 


Vent Mode  Prvc  


 


Mechanical Rate  20  


 


FiO2  100.0  


 


Tidal Volume  500  


 


PEEP  5  


 


Crit Value Called To  Dr quan  


 


Crit Value Called By  [facility]  


 


Crit Value Read Back  Y  


 


Blood Gas Notified Time  1935  


 


Sodium  142.0  


 


Potassium   


 


Chloride  100.0  


 


Carbon Dioxide   


 


Anion Gap   


 


BUN   


 


Creatinine   


 


Est GFR ( Amer)   


 


Est GFR (Non-Af Amer)   


 


POC Glucose (mg/dL)   


 


Random Glucose   


 


Hemoglobin A1c   


 


Calcium   


 


Phosphorus   


 


Magnesium   


 


Iron   


 


TIBC   


 


% Saturation   


 


Transferrin   


 


Total Bilirubin   


 


AST   


 


ALT   


 


Alkaline Phosphatase   


 


Total Creatine Kinase   


 


CK-MB (Mass)   


 


Troponin I   


 


NT-Pro-B Natriuret Pep   


 


Total Protein   


 


Albumin   


 


Globulin   


 


Albumin/Globulin Ratio   


 


Triglycerides   


 


Cholesterol   


 


LDL Cholesterol Direct   


 


HDL Cholesterol   


 


Vitamin B12   


 


Folate   


 


Arterial Blood Potassium  3.7  


 


Urine Color   


 


Urine Clarity   


 


Urine pH   


 


Ur Specific Gravity   


 


Urine Protein   


 


Urine Glucose (UA)   


 


Urine Ketones   


 


Urine Blood   


 


Urine Nitrate   


 


Urine Bilirubin   


 


Urine Urobilinogen   


 


Ur Leukocyte Esterase   


 


Urine WBC (Auto)   


 


Urine RBC (Auto)   


 


Ur Squamous Epith Cells   


 


Urine Bacteria   


 


Blood Type   


 


Antibody Screen   














  03/25/19 03/25/19 03/26/19





  22:15 22:15 00:04


 


WBC   


 


RBC   


 


Hgb   


 


Hct   


 


MCV   


 


MCH   


 


MCHC   


 


RDW   


 


Plt Count   


 


MPV   


 


Neut % (Auto)   


 


Lymph % (Auto)   


 


Mono % (Auto)   


 


Eos % (Auto)   


 


Baso % (Auto)   


 


Neut # (Auto)   


 


Lymph # (Auto)   


 


Mono # (Auto)   


 


Eos # (Auto)   


 


Baso # (Auto)   


 


Neutrophils % (Manual)   


 


Band Neutrophils %   


 


Lymphocytes % (Manual)   


 


Monocytes % (Manual)   


 


Nucleated RBC %   


 


Differential Comment   


 


Platelet Estimate   


 


Large Platelets   


 


Giant Platelets   


 


Polychromasia   


 


Hypochromasia (manual)   


 


Poikilocytosis (manual   


 


Anisocytosis (manual)   


 


Ovalocytes   


 


Schistocytes   


 


Retic Count   


 


PT   


 


INR   


 


APTT   


 


Puncture Site   


 


pCO2   


 


pO2   


 


HCO3   


 


ABG pH   


 


ABG Total CO2   


 


ABG O2 Saturation   


 


ABG Base Excess   


 


John Test   


 


ABG Potassium   


 


A-a O2 Difference   


 


Respiratory Index   


 


Glucose   


 


Lactate   


 


Vent Mode   


 


Mechanical Rate   


 


FiO2   


 


Tidal Volume   


 


PEEP   


 


Crit Value Called To   


 


Crit Value Called By   


 


Crit Value Read Back   


 


Blood Gas Notified Time   


 


Sodium  140  


 


Potassium  3.4 L  


 


Chloride  93 L  


 


Carbon Dioxide  19 L  


 


Anion Gap  30 H  


 


BUN  21 H  


 


Creatinine  8.7 H*  


 


Est GFR ( Amer)  8  


 


Est GFR (Non-Af Amer)  6  


 


POC Glucose (mg/dL)    260 H


 


Random Glucose  240 H  


 


Hemoglobin A1c   


 


Calcium  9.1  


 


Phosphorus  6.9 H  


 


Magnesium  2.6 H  


 


Iron   


 


TIBC   


 


% Saturation   


 


Transferrin   


 


Total Bilirubin   


 


AST   


 


ALT   


 


Alkaline Phosphatase   


 


Total Creatine Kinase   5933 H 


 


CK-MB (Mass)   168 H 


 


Troponin I   107.0000 H* 


 


NT-Pro-B Natriuret Pep   


 


Total Protein   


 


Albumin   


 


Globulin   


 


Albumin/Globulin Ratio   


 


Triglycerides   


 


Cholesterol   


 


LDL Cholesterol Direct   


 


HDL Cholesterol   


 


Vitamin B12   


 


Folate   


 


Arterial Blood Potassium   


 


Urine Color   


 


Urine Clarity   


 


Urine pH   


 


Ur Specific Gravity   


 


Urine Protein   


 


Urine Glucose (UA)   


 


Urine Ketones   


 


Urine Blood   


 


Urine Nitrate   


 


Urine Bilirubin   


 


Urine Urobilinogen   


 


Ur Leukocyte Esterase   


 


Urine WBC (Auto)   


 


Urine RBC (Auto)   


 


Ur Squamous Epith Cells   


 


Urine Bacteria   


 


Blood Type   


 


Antibody Screen   














  03/26/19 03/26/19 03/26/19





  01:13 02:20 03:47


 


WBC   


 


RBC   


 


Hgb   


 


Hct   


 


MCV   


 


MCH   


 


MCHC   


 


RDW   


 


Plt Count   


 


MPV   


 


Neut % (Auto)   


 


Lymph % (Auto)   


 


Mono % (Auto)   


 


Eos % (Auto)   


 


Baso % (Auto)   


 


Neut # (Auto)   


 


Lymph # (Auto)   


 


Mono # (Auto)   


 


Eos # (Auto)   


 


Baso # (Auto)   


 


Neutrophils % (Manual)   


 


Band Neutrophils %   


 


Lymphocytes % (Manual)   


 


Monocytes % (Manual)   


 


Nucleated RBC %   


 


Differential Comment   


 


Platelet Estimate   


 


Large Platelets   


 


Giant Platelets   


 


Polychromasia   


 


Hypochromasia (manual)   


 


Poikilocytosis (manual   


 


Anisocytosis (manual)   


 


Ovalocytes   


 


Schistocytes   


 


Retic Count   


 


PT   


 


INR   


 


APTT   


 


Puncture Site   


 


pCO2   


 


pO2   


 


HCO3   


 


ABG pH   


 


ABG Total CO2   


 


ABG O2 Saturation   


 


ABG Base Excess   


 


John Test   


 


ABG Potassium   


 


A-a O2 Difference   


 


Respiratory Index   


 


Glucose   


 


Lactate   


 


Vent Mode   


 


Mechanical Rate   


 


FiO2   


 


Tidal Volume   


 


PEEP   


 


Crit Value Called To   


 


Crit Value Called By   


 


Crit Value Read Back   


 


Blood Gas Notified Time   


 


Sodium   


 


Potassium   


 


Chloride   


 


Carbon Dioxide   


 


Anion Gap   


 


BUN   


 


Creatinine   


 


Est GFR ( Amer)   


 


Est GFR (Non-Af Amer)   


 


POC Glucose (mg/dL)  253 H  231 H  199 H


 


Random Glucose   


 


Hemoglobin A1c   


 


Calcium   


 


Phosphorus   


 


Magnesium   


 


Iron   


 


TIBC   


 


% Saturation   


 


Transferrin   


 


Total Bilirubin   


 


AST   


 


ALT   


 


Alkaline Phosphatase   


 


Total Creatine Kinase   


 


CK-MB (Mass)   


 


Troponin I   


 


NT-Pro-B Natriuret Pep   


 


Total Protein   


 


Albumin   


 


Globulin   


 


Albumin/Globulin Ratio   


 


Triglycerides   


 


Cholesterol   


 


LDL Cholesterol Direct   


 


HDL Cholesterol   


 


Vitamin B12   


 


Folate   


 


Arterial Blood Potassium   


 


Urine Color   


 


Urine Clarity   


 


Urine pH   


 


Ur Specific Gravity   


 


Urine Protein   


 


Urine Glucose (UA)   


 


Urine Ketones   


 


Urine Blood   


 


Urine Nitrate   


 


Urine Bilirubin   


 


Urine Urobilinogen   


 


Ur Leukocyte Esterase   


 


Urine WBC (Auto)   


 


Urine RBC (Auto)   


 


Ur Squamous Epith Cells   


 


Urine Bacteria   


 


Blood Type   


 


Antibody Screen   














  03/26/19 03/26/19 03/26/19





  04:40 05:13 05:50


 


WBC   


 


RBC   


 


Hgb   


 


Hct   


 


MCV   


 


MCH   


 


MCHC   


 


RDW   


 


Plt Count   


 


MPV   


 


Neut % (Auto)   


 


Lymph % (Auto)   


 


Mono % (Auto)   


 


Eos % (Auto)   


 


Baso % (Auto)   


 


Neut # (Auto)   


 


Lymph # (Auto)   


 


Mono # (Auto)   


 


Eos # (Auto)   


 


Baso # (Auto)   


 


Neutrophils % (Manual)   


 


Band Neutrophils %   


 


Lymphocytes % (Manual)   


 


Monocytes % (Manual)   


 


Nucleated RBC %   


 


Differential Comment   


 


Platelet Estimate   


 


Large Platelets   


 


Giant Platelets   


 


Polychromasia   


 


Hypochromasia (manual)   


 


Poikilocytosis (manual   


 


Anisocytosis (manual)   


 


Ovalocytes   


 


Schistocytes   


 


Retic Count   


 


PT   


 


INR   


 


APTT   


 


Puncture Site   R brac 


 


pCO2   36 


 


pO2   290 H 


 


HCO3   14.8 L 


 


ABG pH   7.20 L 


 


ABG Total CO2   15.2 L 


 


ABG O2 Saturation   99.7 H 


 


ABG Base Excess   -13.1 L 


 


John Test   Na 


 


ABG Potassium   4.1 


 


A-a O2 Difference   164.0 


 


Respiratory Index   0.6 


 


Glucose   131 H 


 


Lactate   17.6 H* 


 


Vent Mode   Prvc 


 


Mechanical Rate   24 


 


FiO2   70.0 


 


Tidal Volume   500 


 


PEEP   5 


 


Crit Value Called To   Tamara icu rn 


 


Crit Value Called By   Pily peralta rt 


 


Crit Value Read Back   Y 


 


Blood Gas Notified Time   615 


 


Sodium   141.0  140


 


Potassium    3.5 L


 


Chloride   99.0  91 L


 


Carbon Dioxide    27


 


Anion Gap    25 H


 


BUN    26 H


 


Creatinine    8.9 H*


 


Est GFR ( Amer)    7


 


Est GFR (Non-Af Amer)    6


 


POC Glucose (mg/dL)  151 H  


 


Random Glucose    134 H D


 


Hemoglobin A1c   


 


Calcium    8.8


 


Phosphorus    5.7 H


 


Magnesium    2.4 H


 


Iron   


 


TIBC   


 


% Saturation   


 


Transferrin   


 


Total Bilirubin    0.5


 


AST    590 H


 


ALT    377 H


 


Alkaline Phosphatase    64


 


Total Creatine Kinase   


 


CK-MB (Mass)   


 


Troponin I   


 


NT-Pro-B Natriuret Pep   


 


Total Protein    7.3


 


Albumin    4.4


 


Globulin    2.9


 


Albumin/Globulin Ratio    1.5


 


Triglycerides    274 H


 


Cholesterol    142


 


LDL Cholesterol Direct    63


 


HDL Cholesterol    23 L


 


Vitamin B12    781


 


Folate    6.3


 


Arterial Blood Potassium   4.1 


 


Urine Color   


 


Urine Clarity   


 


Urine pH   


 


Ur Specific Gravity   


 


Urine Protein   


 


Urine Glucose (UA)   


 


Urine Ketones   


 


Urine Blood   


 


Urine Nitrate   


 


Urine Bilirubin   


 


Urine Urobilinogen   


 


Ur Leukocyte Esterase   


 


Urine WBC (Auto)   


 


Urine RBC (Auto)   


 


Ur Squamous Epith Cells   


 


Urine Bacteria   


 


Blood Type   


 


Antibody Screen   














  03/26/19 03/26/19 03/26/19





  05:50 05:50 05:51


 


WBC    19.6 H


 


RBC    3.42 L


 


Hgb    9.6 L


 


Hct    30.7 L


 


MCV    89.9  D


 


MCH    28.1


 


MCHC    31.2 L


 


RDW    17.0 H


 


Plt Count    183


 


MPV    9.0


 


Neut % (Auto)    87.7 H


 


Lymph % (Auto)    7.2 L


 


Mono % (Auto)    5.0


 


Eos % (Auto)    0.0


 


Baso % (Auto)    0.1


 


Neut # (Auto)    17.2 H


 


Lymph # (Auto)    1.4


 


Mono # (Auto)    1.0 H


 


Eos # (Auto)    0.0


 


Baso # (Auto)    0.0


 


Neutrophils % (Manual)    74


 


Band Neutrophils %    11 H*


 


Lymphocytes % (Manual)    8 L


 


Monocytes % (Manual)    7


 


Nucleated RBC %    1 H


 


Differential Comment   


 


Platelet Estimate    Normal


 


Large Platelets    Present


 


Giant Platelets    Present


 


Polychromasia   


 


Hypochromasia (manual)    Slight


 


Poikilocytosis (manual    Slight


 


Anisocytosis (manual)    Slight


 


Ovalocytes    Moderate


 


Schistocytes    Slight


 


Retic Count   


 


PT   


 


INR   


 


APTT   


 


Puncture Site   


 


pCO2   


 


pO2   


 


HCO3   


 


ABG pH   


 


ABG Total CO2   


 


ABG O2 Saturation   


 


ABG Base Excess   


 


John Test   


 


ABG Potassium   


 


A-a O2 Difference   


 


Respiratory Index   


 


Glucose   


 


Lactate   


 


Vent Mode   


 


Mechanical Rate   


 


FiO2   


 


Tidal Volume   


 


PEEP   


 


Crit Value Called To   


 


Crit Value Called By   


 


Crit Value Read Back   


 


Blood Gas Notified Time   


 


Sodium   


 


Potassium   


 


Chloride   


 


Carbon Dioxide   


 


Anion Gap   


 


BUN   


 


Creatinine   


 


Est GFR ( Amer)   


 


Est GFR (Non-Af Amer)   


 


POC Glucose (mg/dL)   


 


Random Glucose   


 


Hemoglobin A1c   


 


Calcium   


 


Phosphorus   


 


Magnesium   


 


Iron  46 L  


 


TIBC  210 L  


 


% Saturation  22  


 


Transferrin   150.39 L 


 


Total Bilirubin   


 


AST   


 


ALT   


 


Alkaline Phosphatase   


 


Total Creatine Kinase   


 


CK-MB (Mass)   


 


Troponin I   


 


NT-Pro-B Natriuret Pep   


 


Total Protein   


 


Albumin   


 


Globulin   


 


Albumin/Globulin Ratio   


 


Triglycerides   


 


Cholesterol   


 


LDL Cholesterol Direct   


 


HDL Cholesterol   


 


Vitamin B12   


 


Folate   


 


Arterial Blood Potassium   


 


Urine Color   


 


Urine Clarity   


 


Urine pH   


 


Ur Specific Gravity   


 


Urine Protein   


 


Urine Glucose (UA)   


 


Urine Ketones   


 


Urine Blood   


 


Urine Nitrate   


 


Urine Bilirubin   


 


Urine Urobilinogen   


 


Ur Leukocyte Esterase   


 


Urine WBC (Auto)   


 


Urine RBC (Auto)   


 


Ur Squamous Epith Cells   


 


Urine Bacteria   


 


Blood Type   


 


Antibody Screen   














  03/26/19 03/26/19 03/26/19





  05:51 05:51 05:51


 


WBC   


 


RBC   


 


Hgb   


 


Hct   


 


MCV   


 


MCH   


 


MCHC   


 


RDW   


 


Plt Count   


 


MPV   


 


Neut % (Auto)   


 


Lymph % (Auto)   


 


Mono % (Auto)   


 


Eos % (Auto)   


 


Baso % (Auto)   


 


Neut # (Auto)   


 


Lymph # (Auto)   


 


Mono # (Auto)   


 


Eos # (Auto)   


 


Baso # (Auto)   


 


Neutrophils % (Manual)   


 


Band Neutrophils %   


 


Lymphocytes % (Manual)   


 


Monocytes % (Manual)   


 


Nucleated RBC %   


 


Differential Comment   


 


Platelet Estimate   


 


Large Platelets   


 


Giant Platelets   


 


Polychromasia   


 


Hypochromasia (manual)   


 


Poikilocytosis (manual   


 


Anisocytosis (manual)   


 


Ovalocytes   


 


Schistocytes   


 


Retic Count    1.8 H


 


PT   


 


INR   


 


APTT  41 H  


 


Puncture Site   


 


pCO2   


 


pO2   


 


HCO3   


 


ABG pH   


 


ABG Total CO2   


 


ABG O2 Saturation   


 


ABG Base Excess   


 


John Test   


 


ABG Potassium   


 


A-a O2 Difference   


 


Respiratory Index   


 


Glucose   


 


Lactate   


 


Vent Mode   


 


Mechanical Rate   


 


FiO2   


 


Tidal Volume   


 


PEEP   


 


Crit Value Called To   


 


Crit Value Called By   


 


Crit Value Read Back   


 


Blood Gas Notified Time   


 


Sodium   


 


Potassium   


 


Chloride   


 


Carbon Dioxide   


 


Anion Gap   


 


BUN   


 


Creatinine   


 


Est GFR ( Amer)   


 


Est GFR (Non-Af Amer)   


 


POC Glucose (mg/dL)   


 


Random Glucose   


 


Hemoglobin A1c   6.2 


 


Calcium   


 


Phosphorus   


 


Magnesium   


 


Iron   


 


TIBC   


 


% Saturation   


 


Transferrin   


 


Total Bilirubin   


 


AST   


 


ALT   


 


Alkaline Phosphatase   


 


Total Creatine Kinase   


 


CK-MB (Mass)   


 


Troponin I   


 


NT-Pro-B Natriuret Pep   


 


Total Protein   


 


Albumin   


 


Globulin   


 


Albumin/Globulin Ratio   


 


Triglycerides   


 


Cholesterol   


 


LDL Cholesterol Direct   


 


HDL Cholesterol   


 


Vitamin B12   


 


Folate   


 


Arterial Blood Potassium   


 


Urine Color   


 


Urine Clarity   


 


Urine pH   


 


Ur Specific Gravity   


 


Urine Protein   


 


Urine Glucose (UA)   


 


Urine Ketones   


 


Urine Blood   


 


Urine Nitrate   


 


Urine Bilirubin   


 


Urine Urobilinogen   


 


Ur Leukocyte Esterase   


 


Urine WBC (Auto)   


 


Urine RBC (Auto)   


 


Ur Squamous Epith Cells   


 


Urine Bacteria   


 


Blood Type   


 


Antibody Screen   














Assessment & Plan


(1) Hypertension associated with diabetes


Status: Acute

## 2019-03-26 NOTE — CP.CCUPN
CCU Subjective





- Physician Review


Subjective (Free Text): 





ICU Progress Note for Dr. Hines





Pt seen and examined at bedside this am. Currently intubated, unable to obtain 

HPI or ROS due to current pt status. On pressor therapy, hypotensive and 

bradycardic overnight. S/p cardiac arrest with ROSC achieved overnight. Repeat 

cardiac arrest this am with ROSC achieved, please see later progress note for 

detailed hx. 








CCU Objective





- Vital Signs / Intake & Output


Vital Signs (Last 4 hours): 


Vital Signs











  Temp Pulse Resp BP Pulse Ox


 


 03/26/19 07:09   86  16  178/97 H  100


 


 03/26/19 07:00  94 F L  68  24  58/34 L 


 


 03/26/19 06:59   58 L  25 H  58/34 L 


 


 03/26/19 06:52   55 L  24  48/28 L 


 


 03/26/19 06:18   69  15  129/95 H 


 


 03/26/19 06:00  93.5 F L  75  17  129/95 H 


 


 03/26/19 05:04   77  16  132/81  100


 


 03/26/19 05:00  93.2 F L  77  15  132/81  100


 


 03/26/19 04:35   79  15  132/85  100











Intake and Output (Last 8hrs): 


                                 Intake & Output











 03/25/19 03/26/19 03/26/19





 22:59 06:59 14:59


 


Intake Total 1880.6 1348.3 1289.4


 


Output Total 106 400 0


 


Balance 1774.6 948.3 1289.4


 


Weight  206 lb 12.8 oz 


 


Intake:   


 


   615 106


 


  Intake, IV Amount 1299.6 733.3 1183.4


 


    Left Distal Port Femoral 50 100 1000


 


    Left Medial Port Femoral 314.1 355.7 70.4


 


    Left Proximal Port 935.5 277.6 113





    Femoral   


 


Output:   


 


  Gastric Amount  400 


 


    Stomach  400 


 


  Urine 106 0 0


 


    Urethral (Handy) 51 0 0


 


Other:   


 


  # Bowel Movements  0 














- Physical Exam


Head: Positive for: Atraumatic, Normocephalic


Pupils: Positive for: Non-Reactive


Mouth: Positive for: Moist Mucous Membranes


Respiratory/Chest: Positive for: Clear to Auscultation


Cardiovascular: Positive for: Regular Rate and Rhythm.  Negative for: Murmurs, 

Rub, Gallop


Abdomen: Positive for: Normal Bowel Sounds.  Negative for: Tenderness, Dis

tention, Mass/Organomegaly


Upper Extremity: Positive for: Normal Inspection, Normal ROM, NORMAL PULSES, 

Neurovascularly Intact, Capillary Refill < 2s


Lower Extremity: Positive for: Normal Inspection, NORMAL PULSES, Neurovascularly

Intact, Capillary Refill < 2 s


Neurological: Positive for: Other (intubated)


Skin: Positive for: Warm, Dry


Psychiatric: Positive for: Other (intubated)





- Medications


Active Medications: 


Active Medications











Generic Name Dose Route Start Last Admin





  Trade Name Freq  PRN Reason Stop Dose Admin


 


Albuterol/Ipratropium  3 ml  03/25/19 16:00  03/26/19 07:44





  Duoneb 3 Mg/0.5 Mg (3 Ml) Ud  INH   3 ml





  RQ4 TANIKA   Administration





     





     





     





     


 


Artificial Tears  1 gm  03/26/19 07:30  





  Lacri-Lube  OU   





  Q4H TANIKA   





     





     





     





     


 


Calcium Acetate  667 mg  03/26/19 10:00  





  Phoslo  PO   





  DAILY TANIKA   





     





     





     





     


 


Ezetimibe  10 mg  03/25/19 22:00  03/25/19 21:44





  Zetia  PO   Not Given





  HS TANIKA   





     





     





     





     


 


Famotidine  20 mg  03/26/19 10:00  





  Pepcid  PO   





  DAILY TANIKA   





     





     





     





     


 


Gabapentin  100 mg  03/25/19 22:00  03/25/19 21:44





  Neurontin  PO   Not Given





  HS TANIKA   





     





     





     





     


 


Heparin Sodium (Porcine)  5,000 units  03/25/19 22:00  03/26/19 05:48





  Heparin  SC   5,000 units





  Q8 TANIKA   Administration





     





     





     





     


 


Home Med  1 tab  03/26/19 10:00  





  Patient's Own Medication  PO   





  DAILY TANIKA   





     





     





     





     


 


Norepinephrine Bitartrate 4 mg  254 mls @ 15.24 mls/hr  03/25/19 16:42  03/26/19

07:30





  / Sodium Chloride  IV   20 mcg/min





  .[id number] PRN   76.2 mls/hr





  TITRATE PER MD ORDER   Titration





     





  Protocol   





  4 MCG/MIN   


 


Dopamine HCl/Dextrose  400 mg in 250 mls @ 7.032 mls/hr  03/25/19 17:05  

03/26/19 07:00





  Dopamine 400mg/250ml D5w  IV   20 mcg/kg/min





  .Q24H PRN   70.319 mls/hr





  TITRATE PER MD ORDER   Titration





     





  Protocol   





  2 MCG/KG/MIN   


 


Vasopressin 40 units/ Dextrose  42 mls @ 0.63 mls/hr  03/26/19 07:00  





  IV   





  .Q24H TANIKA   





     





     





  Protocol   





  0.01 UNITS/MIN   


 


Sodium Bicarbonate 150 meq/  1,150 mls @ 60 mls/hr  03/26/19 07:15  





  Dextrose  IV   





  .[id number] [facility]   





     





     





     





     


 


Potassium Chloride  20 meq in 100 mls @ 50 mls/hr  03/26/19 08:03  





  Potassium Chloride 20 Meq/100 Ml  IVPB  03/26/19 10:02  





  ONCE ONE   





     





     





     





     


 


Insulin Human Regular  0 unit  03/25/19 22:12  03/26/19 07:15





  Novolin R  SC   Not Given





  Q1H [facility]   





     





     





  Protocol   





     


 


Meperidine HCl  25 mg  03/25/19 15:06  





  Demerol  IVP   





  Q30M PRN   





  [facility]   





     





     





     


 


Potassium Chloride  20 meq  03/26/19 10:00  





  Potassium Chloride Oral Soln  PO   





  DAILY [facility]   





     





     





     





     


 


Raltegravir  400 mg  03/25/19 22:00  





  Isentress  PO   





  Q12H [facility]   





     





     





  Protocol   





     














- Patient Studies


Lab Studies: 


                                   Lab Studies











  03/26/19 03/26/19 03/26/19 Range/Units





  05:51 05:51 05:51 


 


WBC    19.6 H  (4.8-10.8)  K/uL


 


RBC    3.42 L  (4.40-5.90)  Mil/uL


 


Hgb    9.6 L  (12.0-18.0)  g/dL


 


Hct    30.7 L  (35.0-51.0)  %


 


MCV    89.9  D  (80.0-94.0)  fL


 


MCH    28.1  (27.0-31.0)  pg


 


MCHC    31.2 L  (33.0-37.0)  g/dL


 


RDW    17.0 H  (11.5-14.5)  %


 


Plt Count    183  (130-400)  K/uL


 


MPV    9.0  (7.2-11.7)  fL


 


Neut % (Auto)    87.7 H  (50.0-75.0)  %


 


Lymph % (Auto)    7.2 L  (20.0-40.0)  %


 


Mono % (Auto)    5.0  (0.0-10.0)  %


 


Eos % (Auto)    0.0  (0.0-4.0)  %


 


Baso % (Auto)    0.1  (0.0-2.0)  %


 


Neut # (Auto)    17.2 H  (1.8-7.0)  K/uL


 


Lymph # (Auto)    1.4  (1.0-4.3)  K/uL


 


Mono # (Auto)    1.0 H  (0.0-0.8)  K/uL


 


Eos # (Auto)    0.0  (0.0-0.7)  K/uL


 


Baso # (Auto)    0.0  (0.0-0.2)  K/uL


 


Neutrophils % (Manual)     (50-75)  %


 


Band Neutrophils %     (0-2)  %


 


Lymphocytes % (Manual)     (20-40)  %


 


Monocytes % (Manual)     (0-10)  %


 


Differential Comment     


 


Platelet Estimate     (NORMAL)  


 


Polychromasia     


 


Hypochromasia (manual)     


 


Anisocytosis (manual)     


 


Ovalocytes     


 


Retic Count  1.8 H    (0.5-1.5)  %


 


PT     (9.7-12.2)  SECONDS


 


INR     


 


APTT   41 H   (21-34)  SECONDS


 


Puncture Site     


 


pCO2     (35-45)  mm/Hg


 


pO2     ()  mm/Hg


 


HCO3     (21-28)  mmol/L


 


ABG pH     (7.35-7.45)  


 


ABG Total CO2     (22-28)  mmol/L


 


ABG O2 Saturation     (95-98)  %


 


ABG Base Excess     (-2.0-3.0)  mmol/L


 


John Test     


 


ABG Potassium     (3.6-5.2)  mmol/L


 


A-a O2 Difference     mm/Hg


 


Respiratory Index     


 


Glucose     ()  mg/dl


 


Lactate     (0.7-2.1)  mmol/L


 


Vent Mode     


 


Mechanical Rate     


 


FiO2     %


 


Tidal Volume     


 


PEEP     


 


Crit Value Called To     


 


Crit Value Called By     


 


Crit Value Read Back     


 


Blood Gas Notified Time     


 


Sodium     (132-148)  mmol/L


 


Potassium     (3.6-5.2)  mmol/L


 


Chloride     ()  mmol/L


 


Carbon Dioxide     (22-30)  mmol/L


 


Anion Gap     (10-20)  


 


BUN     (9-20)  mg/dL


 


Creatinine     (0.8-1.5)  mg/dL


 


Est GFR (African Amer)     


 


Est GFR (Non-Af Amer)     


 


POC Glucose (mg/dL)     ()  mg/dL


 


Random Glucose     ()  mg/dL


 


Calcium     (8.6-10.4)  mg/dl


 


Phosphorus     (2.5-4.5)  mg/dL


 


Magnesium     (1.6-2.3)  mg/dL


 


Iron     ()  ug/dL


 


TIBC     (250-450)  ug/dL


 


% Saturation     (20-55)  


 


Transferrin     (206-381)  mg/dL


 


Total Bilirubin     (0.2-1.3)  mg/dL


 


AST     (17-59)  U/L


 


ALT     (21-72)  U/L


 


Alkaline Phosphatase     ()  U/L


 


Total Creatine Kinase     ()  U/L


 


CK-MB (Mass)     (0.0-3.38)  ng/mL


 


Troponin I     (0.00-0.120)  ng/mL


 


NT-Pro-B Natriuret Pep     (0-900)  pg/mL


 


Total Protein     (6.3-8.3)  g/dL


 


Albumin     (3.5-5.0)  g/dL


 


Globulin     (2.2-3.9)  gm/dL


 


Albumin/Globulin Ratio     (1.0-2.1)  


 


Triglycerides     (0-149)  mg/dL


 


Cholesterol     (0-199)  mg/dL


 


LDL Cholesterol Direct     (0-129)  mg/dL


 


HDL Cholesterol     (30-70)  mg/dL


 


Vitamin B12     (239-931)  pg/mL


 


Folate     ng/mL


 


Arterial Blood Potassium     (3.6-5.2)  mmol/L


 


Urine Color     (YELLOW)  


 


Urine Clarity     (Clear)  


 


Urine pH     (5.0-8.0)  


 


Ur Specific Gravity     (1.003-1.030)  


 


Urine Protein     (NEGATIVE)  mg/dL


 


Urine Glucose (UA)     (Normal)  mg/dL


 


Urine Ketones     (NEGATIVE)  mg/dL


 


Urine Blood     (NEGATIVE)  


 


Urine Nitrate     (NEGATIVE)  


 


Urine Bilirubin     (NEGATIVE)  


 


Urine Urobilinogen     (0.2-1.0)  mg/dL


 


Ur Leukocyte Esterase     (Negative)  Caesar/uL


 


Urine WBC (Auto)     (0-5)  /hpf


 


Urine RBC (Auto)     (0-3)  /hpf


 


Ur Squamous Epith Cells     (0-5)  /hpf


 


Urine Bacteria     (<OCC)  


 


Blood Type     


 


Antibody Screen     














  03/26/19 03/26/19 03/26/19 Range/Units





  05:50 05:50 05:50 


 


WBC     (4.8-10.8)  K/uL


 


RBC     (4.40-5.90)  Mil/uL


 


Hgb     (12.0-18.0)  g/dL


 


Hct     (35.0-51.0)  %


 


MCV     (80.0-94.0)  fL


 


MCH     (27.0-31.0)  pg


 


MCHC     (33.0-37.0)  g/dL


 


RDW     (11.5-14.5)  %


 


Plt Count     (130-400)  K/uL


 


MPV     (7.2-11.7)  fL


 


Neut % (Auto)     (50.0-75.0)  %


 


Lymph % (Auto)     (20.0-40.0)  %


 


Mono % (Auto)     (0.0-10.0)  %


 


Eos % (Auto)     (0.0-4.0)  %


 


Baso % (Auto)     (0.0-2.0)  %


 


Neut # (Auto)     (1.8-7.0)  K/uL


 


Lymph # (Auto)     (1.0-4.3)  K/uL


 


Mono # (Auto)     (0.0-0.8)  K/uL


 


Eos # (Auto)     (0.0-0.7)  K/uL


 


Baso # (Auto)     (0.0-0.2)  K/uL


 


Neutrophils % (Manual)     (50-75)  %


 


Band Neutrophils %     (0-2)  %


 


Lymphocytes % (Manual)     (20-40)  %


 


Monocytes % (Manual)     (0-10)  %


 


Differential Comment     


 


Platelet Estimate     (NORMAL)  


 


Polychromasia     


 


Hypochromasia (manual)     


 


Anisocytosis (manual)     


 


Ovalocytes     


 


Retic Count     (0.5-1.5)  %


 


PT     (9.7-12.2)  SECONDS


 


INR     


 


APTT     (21-34)  SECONDS


 


Puncture Site     


 


pCO2     (35-45)  mm/Hg


 


pO2     ()  mm/Hg


 


HCO3     (21-28)  mmol/L


 


ABG pH     (7.35-7.45)  


 


ABG Total CO2     (22-28)  mmol/L


 


ABG O2 Saturation     (95-98)  %


 


ABG Base Excess     (-2.0-3.0)  mmol/L


 


John Test     


 


ABG Potassium     (3.6-5.2)  mmol/L


 


A-a O2 Difference     mm/Hg


 


Respiratory Index     


 


Glucose     ()  mg/dl


 


Lactate     (0.7-2.1)  mmol/L


 


Vent Mode     


 


Mechanical Rate     


 


FiO2     %


 


Tidal Volume     


 


PEEP     


 


Crit Value Called To     


 


Crit Value Called By     


 


Crit Value Read Back     


 


Blood Gas Notified Time     


 


Sodium    140  (132-148)  mmol/L


 


Potassium    3.5 L  (3.6-5.2)  mmol/L


 


Chloride    91 L  ()  mmol/L


 


Carbon Dioxide    27  (22-30)  mmol/L


 


Anion Gap    25 H  (10-20)  


 


BUN    26 H  (9-20)  mg/dL


 


Creatinine    8.9 H*  (0.8-1.5)  mg/dL


 


Est GFR ( Amer)    7  


 


Est GFR (Non-Af Amer)    6  


 


POC Glucose (mg/dL)     ()  mg/dL


 


Random Glucose    134 H D  ()  mg/dL


 


Calcium    8.8  (8.6-10.4)  mg/dl


 


Phosphorus    5.7 H  (2.5-4.5)  mg/dL


 


Magnesium    2.4 H  (1.6-2.3)  mg/dL


 


Iron   46 L   ()  ug/dL


 


TIBC   210 L   (250-450)  ug/dL


 


% Saturation   22   (20-55)  


 


Transferrin  150.39 L    (206-381)  mg/dL


 


Total Bilirubin    0.5  (0.2-1.3)  mg/dL


 


AST    590 H  (17-59)  U/L


 


ALT    377 H  (21-72)  U/L


 


Alkaline Phosphatase    64  ()  U/L


 


Total Creatine Kinase     ()  U/L


 


CK-MB (Mass)     (0.0-3.38)  ng/mL


 


Troponin I     (0.00-0.120)  ng/mL


 


NT-Pro-B Natriuret Pep     (0-900)  pg/mL


 


Total Protein    7.3  (6.3-8.3)  g/dL


 


Albumin    4.4  (3.5-5.0)  g/dL


 


Globulin    2.9  (2.2-3.9)  gm/dL


 


Albumin/Globulin Ratio    1.5  (1.0-2.1)  


 


Triglycerides    274 H  (0-149)  mg/dL


 


Cholesterol    142  (0-199)  mg/dL


 


LDL Cholesterol Direct    63  (0-129)  mg/dL


 


HDL Cholesterol    23 L  (30-70)  mg/dL


 


Vitamin B12    781  (239-931)  pg/mL


 


Folate    6.3  ng/mL


 


Arterial Blood Potassium     (3.6-5.2)  mmol/L


 


Urine Color     (YELLOW)  


 


Urine Clarity     (Clear)  


 


Urine pH     (5.0-8.0)  


 


Ur Specific Gravity     (1.003-1.030)  


 


Urine Protein     (NEGATIVE)  mg/dL


 


Urine Glucose (UA)     (Normal)  mg/dL


 


Urine Ketones     (NEGATIVE)  mg/dL


 


Urine Blood     (NEGATIVE)  


 


Urine Nitrate     (NEGATIVE)  


 


Urine Bilirubin     (NEGATIVE)  


 


Urine Urobilinogen     (0.2-1.0)  mg/dL


 


Ur Leukocyte Esterase     (Negative)  Caesar/uL


 


Urine WBC (Auto)     (0-5)  /hpf


 


Urine RBC (Auto)     (0-3)  /hpf


 


Ur Squamous Epith Cells     (0-5)  /hpf


 


Urine Bacteria     (<OCC)  


 


Blood Type     


 


Antibody Screen     














  03/26/19 03/26/19 03/26/19 Range/Units





  05:13 04:40 03:47 


 


WBC     (4.8-10.8)  K/uL


 


RBC     (4.40-5.90)  Mil/uL


 


Hgb     (12.0-18.0)  g/dL


 


Hct     (35.0-51.0)  %


 


MCV     (80.0-94.0)  fL


 


MCH     (27.0-31.0)  pg


 


MCHC     (33.0-37.0)  g/dL


 


RDW     (11.5-14.5)  %


 


Plt Count     (130-400)  K/uL


 


MPV     (7.2-11.7)  fL


 


Neut % (Auto)     (50.0-75.0)  %


 


Lymph % (Auto)     (20.0-40.0)  %


 


Mono % (Auto)     (0.0-10.0)  %


 


Eos % (Auto)     (0.0-4.0)  %


 


Baso % (Auto)     (0.0-2.0)  %


 


Neut # (Auto)     (1.8-7.0)  K/uL


 


Lymph # (Auto)     (1.0-4.3)  K/uL


 


Mono # (Auto)     (0.0-0.8)  K/uL


 


Eos # (Auto)     (0.0-0.7)  K/uL


 


Baso # (Auto)     (0.0-0.2)  K/uL


 


Neutrophils % (Manual)     (50-75)  %


 


Band Neutrophils %     (0-2)  %


 


Lymphocytes % (Manual)     (20-40)  %


 


Monocytes % (Manual)     (0-10)  %


 


Differential Comment     


 


Platelet Estimate     (NORMAL)  


 


Polychromasia     


 


Hypochromasia (manual)     


 


Anisocytosis (manual)     


 


Ovalocytes     


 


Retic Count     (0.5-1.5)  %


 


PT     (9.7-12.2)  SECONDS


 


INR     


 


APTT     (21-34)  SECONDS


 


Puncture Site  R brac    


 


pCO2  36    (35-45)  mm/Hg


 


pO2  290 H    ()  mm/Hg


 


HCO3  14.8 L    (21-28)  mmol/L


 


ABG pH  7.20 L    (7.35-7.45)  


 


ABG Total CO2  15.2 L    (22-28)  mmol/L


 


ABG O2 Saturation  99.7 H    (95-98)  %


 


ABG Base Excess  -13.1 L    (-2.0-3.0)  mmol/L


 


John Test  Na    


 


ABG Potassium  4.1    (3.6-5.2)  mmol/L


 


A-a O2 Difference  164.0    mm/Hg


 


Respiratory Index  0.6    


 


Glucose  131 H    ()  mg/dl


 


Lactate  17.6 H*    (0.7-2.1)  mmol/L


 


Vent Mode  Prvc    


 


Mechanical Rate  24    


 


FiO2  70.0    %


 


Tidal Volume  500    


 


PEEP  5    


 


Crit Value Called To  Tamara icu rn    


 


Crit Value Called By  Pily peralta rt    


 


Crit Value Read Back  Y    


 


Blood Gas Notified Time  615    


 


Sodium  141.0    (132-148)  mmol/L


 


Potassium     (3.6-5.2)  mmol/L


 


Chloride  99.0    ()  mmol/L


 


Carbon Dioxide     (22-30)  mmol/L


 


Anion Gap     (10-20)  


 


BUN     (9-20)  mg/dL


 


Creatinine     (0.8-1.5)  mg/dL


 


Est GFR (African Amer)     


 


Est GFR (Non-Af Amer)     


 


POC Glucose (mg/dL)   151 H  199 H  ()  mg/dL


 


Random Glucose     ()  mg/dL


 


Calcium     (8.6-10.4)  mg/dl


 


Phosphorus     (2.5-4.5)  mg/dL


 


Magnesium     (1.6-2.3)  mg/dL


 


Iron     ()  ug/dL


 


TIBC     (250-450)  ug/dL


 


% Saturation     (20-55)  


 


Transferrin     (206-381)  mg/dL


 


Total Bilirubin     (0.2-1.3)  mg/dL


 


AST     (17-59)  U/L


 


ALT     (21-72)  U/L


 


Alkaline Phosphatase     ()  U/L


 


Total Creatine Kinase     ()  U/L


 


CK-MB (Mass)     (0.0-3.38)  ng/mL


 


Troponin I     (0.00-0.120)  ng/mL


 


NT-Pro-B Natriuret Pep     (0-900)  pg/mL


 


Total Protein     (6.3-8.3)  g/dL


 


Albumin     (3.5-5.0)  g/dL


 


Globulin     (2.2-3.9)  gm/dL


 


Albumin/Globulin Ratio     (1.0-2.1)  


 


Triglycerides     (0-149)  mg/dL


 


Cholesterol     (0-199)  mg/dL


 


LDL Cholesterol Direct     (0-129)  mg/dL


 


HDL Cholesterol     (30-70)  mg/dL


 


Vitamin B12     (239-931)  pg/mL


 


Folate     ng/mL


 


Arterial Blood Potassium  4.1    (3.6-5.2)  mmol/L


 


Urine Color     (YELLOW)  


 


Urine Clarity     (Clear)  


 


Urine pH     (5.0-8.0)  


 


Ur Specific Gravity     (1.003-1.030)  


 


Urine Protein     (NEGATIVE)  mg/dL


 


Urine Glucose (UA)     (Normal)  mg/dL


 


Urine Ketones     (NEGATIVE)  mg/dL


 


Urine Blood     (NEGATIVE)  


 


Urine Nitrate     (NEGATIVE)  


 


Urine Bilirubin     (NEGATIVE)  


 


Urine Urobilinogen     (0.2-1.0)  mg/dL


 


Ur Leukocyte Esterase     (Negative)  Caesar/uL


 


Urine WBC (Auto)     (0-5)  /hpf


 


Urine RBC (Auto)     (0-3)  /hpf


 


Ur Squamous Epith Cells     (0-5)  /hpf


 


Urine Bacteria     (<OCC)  


 


Blood Type     


 


Antibody Screen     














  03/26/19 03/26/19 03/26/19 Range/Units





  02:20 01:13 00:04 


 


WBC     (4.8-10.8)  K/uL


 


RBC     (4.40-5.90)  Mil/uL


 


Hgb     (12.0-18.0)  g/dL


 


Hct     (35.0-51.0)  %


 


MCV     (80.0-94.0)  fL


 


MCH     (27.0-31.0)  pg


 


MCHC     (33.0-37.0)  g/dL


 


RDW     (11.5-14.5)  %


 


Plt Count     (130-400)  K/uL


 


MPV     (7.2-11.7)  fL


 


Neut % (Auto)     (50.0-75.0)  %


 


Lymph % (Auto)     (20.0-40.0)  %


 


Mono % (Auto)     (0.0-10.0)  %


 


Eos % (Auto)     (0.0-4.0)  %


 


Baso % (Auto)     (0.0-2.0)  %


 


Neut # (Auto)     (1.8-7.0)  K/uL


 


Lymph # (Auto)     (1.0-4.3)  K/uL


 


Mono # (Auto)     (0.0-0.8)  K/uL


 


Eos # (Auto)     (0.0-0.7)  K/uL


 


Baso # (Auto)     (0.0-0.2)  K/uL


 


Neutrophils % (Manual)     (50-75)  %


 


Band Neutrophils %     (0-2)  %


 


Lymphocytes % (Manual)     (20-40)  %


 


Monocytes % (Manual)     (0-10)  %


 


Differential Comment     


 


Platelet Estimate     (NORMAL)  


 


Polychromasia     


 


Hypochromasia (manual)     


 


Anisocytosis (manual)     


 


Ovalocytes     


 


Retic Count     (0.5-1.5)  %


 


PT     (9.7-12.2)  SECONDS


 


INR     


 


APTT     (21-34)  SECONDS


 


Puncture Site     


 


pCO2     (35-45)  mm/Hg


 


pO2     ()  mm/Hg


 


HCO3     (21-28)  mmol/L


 


ABG pH     (7.35-7.45)  


 


ABG Total CO2     (22-28)  mmol/L


 


ABG O2 Saturation     (95-98)  %


 


ABG Base Excess     (-2.0-3.0)  mmol/L


 


John Test     


 


ABG Potassium     (3.6-5.2)  mmol/L


 


A-a O2 Difference     mm/Hg


 


Respiratory Index     


 


Glucose     ()  mg/dl


 


Lactate     (0.7-2.1)  mmol/L


 


Vent Mode     


 


Mechanical Rate     


 


FiO2     %


 


Tidal Volume     


 


PEEP     


 


Crit Value Called To     


 


Crit Value Called By     


 


Crit Value Read Back     


 


Blood Gas Notified Time     


 


Sodium     (132-148)  mmol/L


 


Potassium     (3.6-5.2)  mmol/L


 


Chloride     ()  mmol/L


 


Carbon Dioxide     (22-30)  mmol/L


 


Anion Gap     (10-20)  


 


BUN     (9-20)  mg/dL


 


Creatinine     (0.8-1.5)  mg/dL


 


Est GFR (African Amer)     


 


Est GFR (Non-Af Amer)     


 


POC Glucose (mg/dL)  231 H  253 H  260 H  ()  mg/dL


 


Random Glucose     ()  mg/dL


 


Calcium     (8.6-10.4)  mg/dl


 


Phosphorus     (2.5-4.5)  mg/dL


 


Magnesium     (1.6-2.3)  mg/dL


 


Iron     ()  ug/dL


 


TIBC     (250-450)  ug/dL


 


% Saturation     (20-55)  


 


Transferrin     (206-381)  mg/dL


 


Total Bilirubin     (0.2-1.3)  mg/dL


 


AST     (17-59)  U/L


 


ALT     (21-72)  U/L


 


Alkaline Phosphatase     ()  U/L


 


Total Creatine Kinase     ()  U/L


 


CK-MB (Mass)     (0.0-3.38)  ng/mL


 


Troponin I     (0.00-0.120)  ng/mL


 


NT-Pro-B Natriuret Pep     (0-900)  pg/mL


 


Total Protein     (6.3-8.3)  g/dL


 


Albumin     (3.5-5.0)  g/dL


 


Globulin     (2.2-3.9)  gm/dL


 


Albumin/Globulin Ratio     (1.0-2.1)  


 


Triglycerides     (0-149)  mg/dL


 


Cholesterol     (0-199)  mg/dL


 


LDL Cholesterol Direct     (0-129)  mg/dL


 


HDL Cholesterol     (30-70)  mg/dL


 


Vitamin B12     (239-931)  pg/mL


 


Folate     ng/mL


 


Arterial Blood Potassium     (3.6-5.2)  mmol/L


 


Urine Color     (YELLOW)  


 


Urine Clarity     (Clear)  


 


Urine pH     (5.0-8.0)  


 


Ur Specific Gravity     (1.003-1.030)  


 


Urine Protein     (NEGATIVE)  mg/dL


 


Urine Glucose (UA)     (Normal)  mg/dL


 


Urine Ketones     (NEGATIVE)  mg/dL


 


Urine Blood     (NEGATIVE)  


 


Urine Nitrate     (NEGATIVE)  


 


Urine Bilirubin     (NEGATIVE)  


 


Urine Urobilinogen     (0.2-1.0)  mg/dL


 


Ur Leukocyte Esterase     (Negative)  Caesar/uL


 


Urine WBC (Auto)     (0-5)  /hpf


 


Urine RBC (Auto)     (0-3)  /hpf


 


Ur Squamous Epith Cells     (0-5)  /hpf


 


Urine Bacteria     (<OCC)  


 


Blood Type     


 


Antibody Screen     














  03/25/19 03/25/19 03/25/19 Range/Units





  22:15 22:15 22:15 


 


WBC     (4.8-10.8)  K/uL


 


RBC     (4.40-5.90)  Mil/uL


 


Hgb     (12.0-18.0)  g/dL


 


Hct     (35.0-51.0)  %


 


MCV     (80.0-94.0)  fL


 


MCH     (27.0-31.0)  pg


 


MCHC     (33.0-37.0)  g/dL


 


RDW     (11.5-14.5)  %


 


Plt Count     (130-400)  K/uL


 


MPV     (7.2-11.7)  fL


 


Neut % (Auto)     (50.0-75.0)  %


 


Lymph % (Auto)     (20.0-40.0)  %


 


Mono % (Auto)     (0.0-10.0)  %


 


Eos % (Auto)     (0.0-4.0)  %


 


Baso % (Auto)     (0.0-2.0)  %


 


Neut # (Auto)     (1.8-7.0)  K/uL


 


Lymph # (Auto)     (1.0-4.3)  K/uL


 


Mono # (Auto)     (0.0-0.8)  K/uL


 


Eos # (Auto)     (0.0-0.7)  K/uL


 


Baso # (Auto)     (0.0-0.2)  K/uL


 


Neutrophils % (Manual)     (50-75)  %


 


Band Neutrophils %     (0-2)  %


 


Lymphocytes % (Manual)     (20-40)  %


 


Monocytes % (Manual)     (0-10)  %


 


Differential Comment     


 


Platelet Estimate     (NORMAL)  


 


Polychromasia     


 


Hypochromasia (manual)     


 


Anisocytosis (manual)     


 


Ovalocytes     


 


Retic Count     (0.5-1.5)  %


 


PT    13.7 H  (9.7-12.2)  SECONDS


 


INR    1.3  


 


APTT    40 H D  (21-34)  SECONDS


 


Puncture Site     


 


pCO2     (35-45)  mm/Hg


 


pO2     ()  mm/Hg


 


HCO3     (21-28)  mmol/L


 


ABG pH     (7.35-7.45)  


 


ABG Total CO2     (22-28)  mmol/L


 


ABG O2 Saturation     (95-98)  %


 


ABG Base Excess     (-2.0-3.0)  mmol/L


 


John Test     


 


ABG Potassium     (3.6-5.2)  mmol/L


 


A-a O2 Difference     mm/Hg


 


Respiratory Index     


 


Glucose     ()  mg/dl


 


Lactate     (0.7-2.1)  mmol/L


 


Vent Mode     


 


Mechanical Rate     


 


FiO2     %


 


Tidal Volume     


 


PEEP     


 


Crit Value Called To     


 


Crit Value Called By     


 


Crit Value Read Back     


 


Blood Gas Notified Time     


 


Sodium   140   (132-148)  mmol/L


 


Potassium   3.4 L   (3.6-5.2)  mmol/L


 


Chloride   93 L   ()  mmol/L


 


Carbon Dioxide   19 L   (22-30)  mmol/L


 


Anion Gap   30 H   (10-20)  


 


BUN   21 H   (9-20)  mg/dL


 


Creatinine   8.7 H*   (0.8-1.5)  mg/dL


 


Est GFR ( Amer)   8   


 


Est GFR (Non-Af Amer)   6   


 


POC Glucose (mg/dL)     ()  mg/dL


 


Random Glucose   240 H   ()  mg/dL


 


Calcium   9.1   (8.6-10.4)  mg/dl


 


Phosphorus   6.9 H   (2.5-4.5)  mg/dL


 


Magnesium   2.6 H   (1.6-2.3)  mg/dL


 


Iron     ()  ug/dL


 


TIBC     (250-450)  ug/dL


 


% Saturation     (20-55)  


 


Transferrin     (206-381)  mg/dL


 


Total Bilirubin     (0.2-1.3)  mg/dL


 


AST     (17-59)  U/L


 


ALT     (21-72)  U/L


 


Alkaline Phosphatase     ()  U/L


 


Total Creatine Kinase  5933 H    ()  U/L


 


CK-MB (Mass)  168 H    (0.0-3.38)  ng/mL


 


Troponin I  107.0000 H*    (0.00-0.120)  ng/mL


 


NT-Pro-B Natriuret Pep     (0-900)  pg/mL


 


Total Protein     (6.3-8.3)  g/dL


 


Albumin     (3.5-5.0)  g/dL


 


Globulin     (2.2-3.9)  gm/dL


 


Albumin/Globulin Ratio     (1.0-2.1)  


 


Triglycerides     (0-149)  mg/dL


 


Cholesterol     (0-199)  mg/dL


 


LDL Cholesterol Direct     (0-129)  mg/dL


 


HDL Cholesterol     (30-70)  mg/dL


 


Vitamin B12     (239-931)  pg/mL


 


Folate     ng/mL


 


Arterial Blood Potassium     (3.6-5.2)  mmol/L


 


Urine Color     (YELLOW)  


 


Urine Clarity     (Clear)  


 


Urine pH     (5.0-8.0)  


 


Ur Specific Gravity     (1.003-1.030)  


 


Urine Protein     (NEGATIVE)  mg/dL


 


Urine Glucose (UA)     (Normal)  mg/dL


 


Urine Ketones     (NEGATIVE)  mg/dL


 


Urine Blood     (NEGATIVE)  


 


Urine Nitrate     (NEGATIVE)  


 


Urine Bilirubin     (NEGATIVE)  


 


Urine Urobilinogen     (0.2-1.0)  mg/dL


 


Ur Leukocyte Esterase     (Negative)  Caesar/uL


 


Urine WBC (Auto)     (0-5)  /hpf


 


Urine RBC (Auto)     (0-3)  /hpf


 


Ur Squamous Epith Cells     (0-5)  /hpf


 


Urine Bacteria     (<OCC)  


 


Blood Type     


 


Antibody Screen     














  03/25/19 03/25/19 03/25/19 Range/Units





  22:15 19:30 16:40 


 


WBC  20.8 H    (4.8-10.8)  K/uL


 


RBC  3.36 L    (4.40-5.90)  Mil/uL


 


Hgb  9.5 L    (12.0-18.0)  g/dL


 


Hct  31.0 L    (35.0-51.0)  %


 


MCV  92.4  D    (80.0-94.0)  fL


 


MCH  28.2    (27.0-31.0)  pg


 


MCHC  30.6 L    (33.0-37.0)  g/dL


 


RDW  17.0 H    (11.5-14.5)  %


 


Plt Count  181    (130-400)  K/uL


 


MPV  9.2    (7.2-11.7)  fL


 


Neut % (Auto)  91.2 H    (50.0-75.0)  %


 


Lymph % (Auto)  3.8 L    (20.0-40.0)  %


 


Mono % (Auto)  4.8    (0.0-10.0)  %


 


Eos % (Auto)  0.1    (0.0-4.0)  %


 


Baso % (Auto)  0.1    (0.0-2.0)  %


 


Neut # (Auto)  18.9 H    (1.8-7.0)  K/uL


 


Lymph # (Auto)  0.8 L    (1.0-4.3)  K/uL


 


Mono # (Auto)  1.0 H    (0.0-0.8)  K/uL


 


Eos # (Auto)  0.0    (0.0-0.7)  K/uL


 


Baso # (Auto)  0.0    (0.0-0.2)  K/uL


 


Neutrophils % (Manual)  88 H    (50-75)  %


 


Band Neutrophils %  5 H    (0-2)  %


 


Lymphocytes % (Manual)  2 L    (20-40)  %


 


Monocytes % (Manual)  5    (0-10)  %


 


Differential Comment     


 


Platelet Estimate  Normal    (NORMAL)  


 


Polychromasia     


 


Hypochromasia (manual)     


 


Anisocytosis (manual)     


 


Ovalocytes     


 


Retic Count     (0.5-1.5)  %


 


PT     (9.7-12.2)  SECONDS


 


INR     


 


APTT     (21-34)  SECONDS


 


Puncture Site   Rba   


 


pCO2   43   (35-45)  mm/Hg


 


pO2   285 H   ()  mm/Hg


 


HCO3   15.3 L   (21-28)  mmol/L


 


ABG pH   7.17 L*   (7.35-7.45)  


 


ABG Total CO2   17.0 L   (22-28)  mmol/L


 


ABG O2 Saturation   99.5 H   (95-98)  %


 


ABG Base Excess   -12.4 L   (-2.0-3.0)  mmol/L


 


John Test   Pos   


 


ABG Potassium   3.7   (3.6-5.2)  mmol/L


 


A-a O2 Difference   374.0   mm/Hg


 


Respiratory Index   1.3   


 


Glucose   247 H   ()  mg/dl


 


Lactate   15.6 H*   (0.7-2.1)  mmol/L


 


Vent Mode   Prvc   


 


Mechanical Rate   20   


 


FiO2   100.0   %


 


Tidal Volume   500   


 


PEEP   5   


 


Crit Value Called To   Dr quan   


 


Crit Value Called By   [facility]   


 


Crit Value Read Back   Y   


 


Blood Gas Notified Time   1935   


 


Sodium   142.0   (132-148)  mmol/L


 


Potassium     (3.6-5.2)  mmol/L


 


Chloride   100.0   ()  mmol/L


 


Carbon Dioxide     (22-30)  mmol/L


 


Anion Gap     (10-20)  


 


BUN     (9-20)  mg/dL


 


Creatinine     (0.8-1.5)  mg/dL


 


Est GFR (African Amer)     


 


Est GFR (Non-Af Amer)     


 


POC Glucose (mg/dL)     ()  mg/dL


 


Random Glucose     ()  mg/dL


 


Calcium     (8.6-10.4)  mg/dl


 


Phosphorus     (2.5-4.5)  mg/dL


 


Magnesium     (1.6-2.3)  mg/dL


 


Iron     ()  ug/dL


 


TIBC     (250-450)  ug/dL


 


% Saturation     (20-55)  


 


Transferrin     (206-381)  mg/dL


 


Total Bilirubin     (0.2-1.3)  mg/dL


 


AST     (17-59)  U/L


 


ALT     (21-72)  U/L


 


Alkaline Phosphatase     ()  U/L


 


Total Creatine Kinase    3962 H  ()  U/L


 


CK-MB (Mass)    103 H  (0.0-3.38)  ng/mL


 


Troponin I    61.5000 H*  (0.00-0.120)  ng/mL


 


NT-Pro-B Natriuret Pep     (0-900)  pg/mL


 


Total Protein     (6.3-8.3)  g/dL


 


Albumin     (3.5-5.0)  g/dL


 


Globulin     (2.2-3.9)  gm/dL


 


Albumin/Globulin Ratio     (1.0-2.1)  


 


Triglycerides     (0-149)  mg/dL


 


Cholesterol     (0-199)  mg/dL


 


LDL Cholesterol Direct     (0-129)  mg/dL


 


HDL Cholesterol     (30-70)  mg/dL


 


Vitamin B12     (239-931)  pg/mL


 


Folate     ng/mL


 


Arterial Blood Potassium   3.7   (3.6-5.2)  mmol/L


 


Urine Color     (YELLOW)  


 


Urine Clarity     (Clear)  


 


Urine pH     (5.0-8.0)  


 


Ur Specific Gravity     (1.003-1.030)  


 


Urine Protein     (NEGATIVE)  mg/dL


 


Urine Glucose (UA)     (Normal)  mg/dL


 


Urine Ketones     (NEGATIVE)  mg/dL


 


Urine Blood     (NEGATIVE)  


 


Urine Nitrate     (NEGATIVE)  


 


Urine Bilirubin     (NEGATIVE)  


 


Urine Urobilinogen     (0.2-1.0)  mg/dL


 


Ur Leukocyte Esterase     (Negative)  Caesar/uL


 


Urine WBC (Auto)     (0-5)  /hpf


 


Urine RBC (Auto)     (0-3)  /hpf


 


Ur Squamous Epith Cells     (0-5)  /hpf


 


Urine Bacteria     (<OCC)  


 


Blood Type     


 


Antibody Screen     














  03/25/19 03/25/19 03/25/19 Range/Units





  14:44 14:44 14:44 


 


WBC    15.2 H  (4.8-10.8)  K/uL


 


RBC    3.59 L  (4.40-5.90)  Mil/uL


 


Hgb    10.1 L  (12.0-18.0)  g/dL


 


Hct    32.1 L  (35.0-51.0)  %


 


MCV    89.3  D  (80.0-94.0)  fL


 


MCH    28.1  (27.0-31.0)  pg


 


MCHC    31.5 L  (33.0-37.0)  g/dL


 


RDW    17.0 H  (11.5-14.5)  %


 


Plt Count    178  (130-400)  K/uL


 


MPV    8.8  (7.2-11.7)  fL


 


Neut % (Auto)    84.4 H  (50.0-75.0)  %


 


Lymph % (Auto)    7.4 L  (20.0-40.0)  %


 


Mono % (Auto)    7.7  (0.0-10.0)  %


 


Eos % (Auto)    0.2  (0.0-4.0)  %


 


Baso % (Auto)    0.3  (0.0-2.0)  %


 


Neut # (Auto)    12.9 H  (1.8-7.0)  K/uL


 


Lymph # (Auto)    1.1  (1.0-4.3)  K/uL


 


Mono # (Auto)    1.2 H  (0.0-0.8)  K/uL


 


Eos # (Auto)    0.0  (0.0-0.7)  K/uL


 


Baso # (Auto)    0.0  (0.0-0.2)  K/uL


 


Neutrophils % (Manual)    88 H  (50-75)  %


 


Band Neutrophils %    2  (0-2)  %


 


Lymphocytes % (Manual)    7 L  (20-40)  %


 


Monocytes % (Manual)    3  (0-10)  %


 


Differential Comment     


 


Platelet Estimate    Normal  (NORMAL)  


 


Polychromasia    Slight  


 


Hypochromasia (manual)    Slight  


 


Anisocytosis (manual)    Slight  


 


Ovalocytes    Slight  


 


Retic Count     (0.5-1.5)  %


 


PT     (9.7-12.2)  SECONDS


 


INR     


 


APTT     (21-34)  SECONDS


 


Puncture Site     


 


pCO2     (35-45)  mm/Hg


 


pO2     ()  mm/Hg


 


HCO3     (21-28)  mmol/L


 


ABG pH     (7.35-7.45)  


 


ABG Total CO2     (22-28)  mmol/L


 


ABG O2 Saturation     (95-98)  %


 


ABG Base Excess     (-2.0-3.0)  mmol/L


 


John Test     


 


ABG Potassium     (3.6-5.2)  mmol/L


 


A-a O2 Difference     mm/Hg


 


Respiratory Index     


 


Glucose     ()  mg/dl


 


Lactate     (0.7-2.1)  mmol/L


 


Vent Mode     


 


Mechanical Rate     


 


FiO2     %


 


Tidal Volume     


 


PEEP     


 


Crit Value Called To     


 


Crit Value Called By     


 


Crit Value Read Back     


 


Blood Gas Notified Time     


 


Sodium   138   (132-148)  mmol/L


 


Potassium   3.5 L   (3.6-5.2)  mmol/L


 


Chloride   92 L   ()  mmol/L


 


Carbon Dioxide   25   (22-30)  mmol/L


 


Anion Gap   24 H   (10-20)  


 


BUN   18   (9-20)  mg/dL


 


Creatinine   7.9 H*   (0.8-1.5)  mg/dL


 


Est GFR ( Amer)   8   


 


Est GFR (Non-Af Amer)   7   


 


POC Glucose (mg/dL)     ()  mg/dL


 


Random Glucose   282 H   ()  mg/dL


 


Calcium   9.4   (8.6-10.4)  mg/dl


 


Phosphorus   4.0   (2.5-4.5)  mg/dL


 


Magnesium   2.4 H   (1.6-2.3)  mg/dL


 


Iron     ()  ug/dL


 


TIBC     (250-450)  ug/dL


 


% Saturation     (20-55)  


 


Transferrin     (206-381)  mg/dL


 


Total Bilirubin   0.7   (0.2-1.3)  mg/dL


 


AST   527 H D   (17-59)  U/L


 


ALT   446 H D   (21-72)  U/L


 


Alkaline Phosphatase   74   ()  U/L


 


Total Creatine Kinase     ()  U/L


 


CK-MB (Mass)     (0.0-3.38)  ng/mL


 


Troponin I     (0.00-0.120)  ng/mL


 


NT-Pro-B Natriuret Pep     (0-900)  pg/mL


 


Total Protein   7.5   (6.3-8.3)  g/dL


 


Albumin   4.5   (3.5-5.0)  g/dL


 


Globulin   3.0   (2.2-3.9)  gm/dL


 


Albumin/Globulin Ratio   1.5   (1.0-2.1)  


 


Triglycerides     (0-149)  mg/dL


 


Cholesterol     (0-199)  mg/dL


 


LDL Cholesterol Direct     (0-129)  mg/dL


 


HDL Cholesterol     (30-70)  mg/dL


 


Vitamin B12     (239-931)  pg/mL


 


Folate     ng/mL


 


Arterial Blood Potassium     (3.6-5.2)  mmol/L


 


Urine Color  Yellow    (YELLOW)  


 


Urine Clarity  Hazy    (Clear)  


 


Urine pH  5.0    (5.0-8.0)  


 


Ur Specific Gravity  1.023    (1.003-1.030)  


 


Urine Protein  1+ H    (NEGATIVE)  mg/dL


 


Urine Glucose (UA)  Normal    (Normal)  mg/dL


 


Urine Ketones  Negative    (NEGATIVE)  mg/dL


 


Urine Blood  Negative    (NEGATIVE)  


 


Urine Nitrate  Negative    (NEGATIVE)  


 


Urine Bilirubin  Negative    (NEGATIVE)  


 


Urine Urobilinogen  Normal    (0.2-1.0)  mg/dL


 


Ur Leukocyte Esterase  Neg    (Negative)  Caesar/uL


 


Urine WBC (Auto)  2    (0-5)  /hpf


 


Urine RBC (Auto)  3    (0-3)  /hpf


 


Ur Squamous Epith Cells  < 1    (0-5)  /hpf


 


Urine Bacteria  Rare    (<OCC)  


 


Blood Type     


 


Antibody Screen     














  03/25/19 03/25/19 03/25/19 Range/Units





  11:50 11:26 11:04 


 


WBC     (4.8-10.8)  K/uL


 


RBC     (4.40-5.90)  Mil/uL


 


Hgb     (12.0-18.0)  g/dL


 


Hct     (35.0-51.0)  %


 


MCV     (80.0-94.0)  fL


 


MCH     (27.0-31.0)  pg


 


MCHC     (33.0-37.0)  g/dL


 


RDW     (11.5-14.5)  %


 


Plt Count     (130-400)  K/uL


 


MPV     (7.2-11.7)  fL


 


Neut % (Auto)     (50.0-75.0)  %


 


Lymph % (Auto)     (20.0-40.0)  %


 


Mono % (Auto)     (0.0-10.0)  %


 


Eos % (Auto)     (0.0-4.0)  %


 


Baso % (Auto)     (0.0-2.0)  %


 


Neut # (Auto)     (1.8-7.0)  K/uL


 


Lymph # (Auto)     (1.0-4.3)  K/uL


 


Mono # (Auto)     (0.0-0.8)  K/uL


 


Eos # (Auto)     (0.0-0.7)  K/uL


 


Baso # (Auto)     (0.0-0.2)  K/uL


 


Neutrophils % (Manual)     (50-75)  %


 


Band Neutrophils %     (0-2)  %


 


Lymphocytes % (Manual)     (20-40)  %


 


Monocytes % (Manual)     (0-10)  %


 


Differential Comment     


 


Platelet Estimate     (NORMAL)  


 


Polychromasia     


 


Hypochromasia (manual)     


 


Anisocytosis (manual)     


 


Ovalocytes     


 


Retic Count     (0.5-1.5)  %


 


PT     (9.7-12.2)  SECONDS


 


INR     


 


APTT     (21-34)  SECONDS


 


Puncture Site  Rb    


 


pCO2  23 L    (35-45)  mm/Hg


 


pO2  267 H    ()  mm/Hg


 


HCO3  6.8 L*    (21-28)  mmol/L


 


ABG pH  7.03 L*    (7.35-7.45)  


 


ABG Total CO2  6.8 L    (22-28)  mmol/L


 


ABG O2 Saturation  100.6 H    (95-98)  %


 


ABG Base Excess  -23.3 L    (-2.0-3.0)  mmol/L


 


John Test  Na    


 


ABG Potassium  1.2 L*    (3.6-5.2)  mmol/L


 


A-a O2 Difference  417.0    mm/Hg


 


Respiratory Index  1.6    


 


Glucose  111 H    ()  mg/dl


 


Lactate  6.0 H*    (0.7-2.1)  mmol/L


 


Vent Mode  Prvc    


 


Mechanical Rate  16    


 


FiO2  100.0    %


 


Tidal Volume  500    


 


PEEP  5    


 


Crit Value Called To  ABDIRASHID mcclure do    


 


Crit Value Called By  MOHINI lester rrt    


 


Crit Value Read Back  Y    


 


Blood Gas Notified Time  1200    


 


Sodium  158.0 H   141  (132-148)  mmol/L


 


Potassium    3.1 L  (3.6-5.2)  mmol/L


 


Chloride  131.0 H   95 L  ()  mmol/L


 


Carbon Dioxide    16 L  (22-30)  mmol/L


 


Anion Gap    32 H  (10-20)  


 


BUN    13  (9-20)  mg/dL


 


Creatinine    6.6 H  (0.8-1.5)  mg/dL


 


Est GFR ( Amer)    10  


 


Est GFR (Non-Af Amer)    9  


 


POC Glucose (mg/dL)     ()  mg/dL


 


Random Glucose    304 H  ()  mg/dL


 


Calcium    9.6  (8.6-10.4)  mg/dl


 


Phosphorus     (2.5-4.5)  mg/dL


 


Magnesium     (1.6-2.3)  mg/dL


 


Iron     ()  ug/dL


 


TIBC     (250-450)  ug/dL


 


% Saturation     (20-55)  


 


Transferrin     (206-381)  mg/dL


 


Total Bilirubin    0.4  (0.2-1.3)  mg/dL


 


AST    266 H  (17-59)  U/L


 


ALT    335 H  (21-72)  U/L


 


Alkaline Phosphatase    49  ()  U/L


 


Total Creatine Kinase    277 H  ()  U/L


 


CK-MB (Mass)    5.15 H  (0.0-3.38)  ng/mL


 


Troponin I    0.2240 H*  (0.00-0.120)  ng/mL


 


NT-Pro-B Natriuret Pep    81906 H  (0-900)  pg/mL


 


Total Protein    6.4  (6.3-8.3)  g/dL


 


Albumin    3.9  (3.5-5.0)  g/dL


 


Globulin    2.4  (2.2-3.9)  gm/dL


 


Albumin/Globulin Ratio    1.6  (1.0-2.1)  


 


Triglycerides     (0-149)  mg/dL


 


Cholesterol     (0-199)  mg/dL


 


LDL Cholesterol Direct     (0-129)  mg/dL


 


HDL Cholesterol     (30-70)  mg/dL


 


Vitamin B12     (239-931)  pg/mL


 


Folate     ng/mL


 


Arterial Blood Potassium  1.2 L*    (3.6-5.2)  mmol/L


 


Urine Color     (YELLOW)  


 


Urine Clarity     (Clear)  


 


Urine pH     (5.0-8.0)  


 


Ur Specific Gravity     (1.003-1.030)  


 


Urine Protein     (NEGATIVE)  mg/dL


 


Urine Glucose (UA)     (Normal)  mg/dL


 


Urine Ketones     (NEGATIVE)  mg/dL


 


Urine Blood     (NEGATIVE)  


 


Urine Nitrate     (NEGATIVE)  


 


Urine Bilirubin     (NEGATIVE)  


 


Urine Urobilinogen     (0.2-1.0)  mg/dL


 


Ur Leukocyte Esterase     (Negative)  Caesar/uL


 


Urine WBC (Auto)     (0-5)  /hpf


 


Urine RBC (Auto)     (0-3)  /hpf


 


Ur Squamous Epith Cells     (0-5)  /hpf


 


Urine Bacteria     (<OCC)  


 


Blood Type   A POSITIVE   


 


Antibody Screen   Negative   














  03/25/19 03/25/19 Range/Units





  11:04 11:04 


 


WBC   23.3 H  (4.8-10.8)  K/uL


 


RBC   3.46 L  (4.40-5.90)  Mil/uL


 


Hgb   9.5 L  (12.0-18.0)  g/dL


 


Hct   32.5 L  (35.0-51.0)  %


 


MCV   93.8  (80.0-94.0)  fL


 


MCH   27.5  (27.0-31.0)  pg


 


MCHC   29.4 L  (33.0-37.0)  g/dL


 


RDW   16.7 H  (11.5-14.5)  %


 


Plt Count   99 L  (130-400)  K/uL


 


MPV   8.3  (7.2-11.7)  fL


 


Neut % (Auto)   49.9 L  (50.0-75.0)  %


 


Lymph % (Auto)   43.3 H  (20.0-40.0)  %


 


Mono % (Auto)   4.9  (0.0-10.0)  %


 


Eos % (Auto)   1.5  (0.0-4.0)  %


 


Baso % (Auto)   0.4  (0.0-2.0)  %


 


Neut # (Auto)   11.6 H  (1.8-7.0)  K/uL


 


Lymph # (Auto)   10.1 H  (1.0-4.3)  K/uL


 


Mono # (Auto)   1.1 H  (0.0-0.8)  K/uL


 


Eos # (Auto)   0.4  (0.0-0.7)  K/uL


 


Baso # (Auto)   0.1  (0.0-0.2)  K/uL


 


Neutrophils % (Manual)    (50-75)  %


 


Band Neutrophils %    (0-2)  %


 


Lymphocytes % (Manual)    (20-40)  %


 


Monocytes % (Manual)    (0-10)  %


 


Differential Comment     


 


Platelet Estimate    (NORMAL)  


 


Polychromasia    


 


Hypochromasia (manual)    


 


Anisocytosis (manual)    


 


Ovalocytes    


 


Retic Count    (0.5-1.5)  %


 


PT  12.5 H   (9.7-12.2)  SECONDS


 


INR  1.1   


 


APTT  45 H   (21-34)  SECONDS


 


Puncture Site    


 


pCO2    (35-45)  mm/Hg


 


pO2    ()  mm/Hg


 


HCO3    (21-28)  mmol/L


 


ABG pH    (7.35-7.45)  


 


ABG Total CO2    (22-28)  mmol/L


 


ABG O2 Saturation    (95-98)  %


 


ABG Base Excess    (-2.0-3.0)  mmol/L


 


John Test    


 


ABG Potassium    (3.6-5.2)  mmol/L


 


A-a O2 Difference    mm/Hg


 


Respiratory Index    


 


Glucose    ()  mg/dl


 


Lactate    (0.7-2.1)  mmol/L


 


Vent Mode    


 


Mechanical Rate    


 


FiO2    %


 


Tidal Volume    


 


PEEP    


 


Crit Value Called To    


 


Crit Value Called By    


 


Crit Value Read Back    


 


Blood Gas Notified Time    


 


Sodium    (132-148)  mmol/L


 


Potassium    (3.6-5.2)  mmol/L


 


Chloride    ()  mmol/L


 


Carbon Dioxide    (22-30)  mmol/L


 


Anion Gap    (10-20)  


 


BUN    (9-20)  mg/dL


 


Creatinine    (0.8-1.5)  mg/dL


 


Est GFR (African Amer)    


 


Est GFR (Non-Af Amer)    


 


POC Glucose (mg/dL)    ()  mg/dL


 


Random Glucose    ()  mg/dL


 


Calcium    (8.6-10.4)  mg/dl


 


Phosphorus    (2.5-4.5)  mg/dL


 


Magnesium    (1.6-2.3)  mg/dL


 


Iron    ()  ug/dL


 


TIBC    (250-450)  ug/dL


 


% Saturation    (20-55)  


 


Transferrin    (206-381)  mg/dL


 


Total Bilirubin    (0.2-1.3)  mg/dL


 


AST    (17-59)  U/L


 


ALT    (21-72)  U/L


 


Alkaline Phosphatase    ()  U/L


 


Total Creatine Kinase    ()  U/L


 


CK-MB (Mass)    (0.0-3.38)  ng/mL


 


Troponin I    (0.00-0.120)  ng/mL


 


NT-Pro-B Natriuret Pep    (0-900)  pg/mL


 


Total Protein    (6.3-8.3)  g/dL


 


Albumin    (3.5-5.0)  g/dL


 


Globulin    (2.2-3.9)  gm/dL


 


Albumin/Globulin Ratio    (1.0-2.1)  


 


Triglycerides    (0-149)  mg/dL


 


Cholesterol    (0-199)  mg/dL


 


LDL Cholesterol Direct    (0-129)  mg/dL


 


HDL Cholesterol    (30-70)  mg/dL


 


Vitamin B12    (239-931)  pg/mL


 


Folate    ng/mL


 


Arterial Blood Potassium    (3.6-5.2)  mmol/L


 


Urine Color    (YELLOW)  


 


Urine Clarity    (Clear)  


 


Urine pH    (5.0-8.0)  


 


Ur Specific Gravity    (1.003-1.030)  


 


Urine Protein    (NEGATIVE)  mg/dL


 


Urine Glucose (UA)    (Normal)  mg/dL


 


Urine Ketones    (NEGATIVE)  mg/dL


 


Urine Blood    (NEGATIVE)  


 


Urine Nitrate    (NEGATIVE)  


 


Urine Bilirubin    (NEGATIVE)  


 


Urine Urobilinogen    (0.2-1.0)  mg/dL


 


Ur Leukocyte Esterase    (Negative)  Caesar/uL


 


Urine WBC (Auto)    (0-5)  /hpf


 


Urine RBC (Auto)    (0-3)  /hpf


 


Ur Squamous Epith Cells    (0-5)  /hpf


 


Urine Bacteria    (<OCC)  


 


Blood Type    


 


Antibody Screen    








                         Laboratory Results - last 24 hr











  03/25/19 03/25/19 03/25/19





  11:04 11:04 11:04


 


WBC  23.3 H  


 


RBC  3.46 L  


 


Hgb  9.5 L  


 


Hct  32.5 L  


 


MCV  93.8  


 


MCH  27.5  


 


MCHC  29.4 L  


 


RDW  16.7 H  


 


Plt Count  99 L  


 


MPV  8.3  


 


Neut % (Auto)  49.9 L  


 


Lymph % (Auto)  43.3 H  


 


Mono % (Auto)  4.9  


 


Eos % (Auto)  1.5  


 


Baso % (Auto)  0.4  


 


Neut # (Auto)  11.6 H  


 


Lymph # (Auto)  10.1 H  


 


Mono # (Auto)  1.1 H  


 


Eos # (Auto)  0.4  


 


Baso # (Auto)  0.1  


 


Neutrophils % (Manual)   


 


Band Neutrophils %   


 


Lymphocytes % (Manual)   


 


Monocytes % (Manual)   


 


Differential Comment    


 


Platelet Estimate   


 


Polychromasia   


 


Hypochromasia (manual)   


 


Anisocytosis (manual)   


 


Ovalocytes   


 


Retic Count   


 


PT   12.5 H 


 


INR   1.1 


 


APTT   45 H 


 


Puncture Site   


 


pCO2   


 


pO2   


 


HCO3   


 


ABG pH   


 


ABG Total CO2   


 


ABG O2 Saturation   


 


ABG Base Excess   


 


John Test   


 


ABG Potassium   


 


A-a O2 Difference   


 


Respiratory Index   


 


Glucose   


 


Lactate   


 


Vent Mode   


 


Mechanical Rate   


 


FiO2   


 


Tidal Volume   


 


PEEP   


 


Crit Value Called To   


 


Crit Value Called By   


 


Crit Value Read Back   


 


Blood Gas Notified Time   


 


Sodium    141


 


Potassium    3.1 L


 


Chloride    95 L


 


Carbon Dioxide    16 L


 


Anion Gap    32 H


 


BUN    13


 


Creatinine    6.6 H


 


Est GFR ( Amer)    10


 


Est GFR (Non-Af Amer)    9


 


POC Glucose (mg/dL)   


 


Random Glucose    304 H


 


Calcium    9.6


 


Phosphorus   


 


Magnesium   


 


Iron   


 


TIBC   


 


% Saturation   


 


Transferrin   


 


Total Bilirubin    0.4


 


AST    266 H


 


ALT    335 H


 


Alkaline Phosphatase    49


 


Total Creatine Kinase    277 H


 


CK-MB (Mass)    5.15 H


 


Troponin I    0.2240 H*


 


NT-Pro-B Natriuret Pep    02058 H


 


Total Protein    6.4


 


Albumin    3.9


 


Globulin    2.4


 


Albumin/Globulin Ratio    1.6


 


Triglycerides   


 


Cholesterol   


 


LDL Cholesterol Direct   


 


HDL Cholesterol   


 


Vitamin B12   


 


Folate   


 


Arterial Blood Potassium   


 


Urine Color   


 


Urine Clarity   


 


Urine pH   


 


Ur Specific Gravity   


 


Urine Protein   


 


Urine Glucose (UA)   


 


Urine Ketones   


 


Urine Blood   


 


Urine Nitrate   


 


Urine Bilirubin   


 


Urine Urobilinogen   


 


Ur Leukocyte Esterase   


 


Urine WBC (Auto)   


 


Urine RBC (Auto)   


 


Ur Squamous Epith Cells   


 


Urine Bacteria   


 


Blood Type   


 


Antibody Screen   














  03/25/19 03/25/19 03/25/19





  11:26 11:50 14:44


 


WBC    15.2 H


 


RBC    3.59 L


 


Hgb    10.1 L


 


Hct    32.1 L


 


MCV    89.3  D


 


MCH    28.1


 


MCHC    31.5 L


 


RDW    17.0 H


 


Plt Count    178


 


MPV    8.8


 


Neut % (Auto)    84.4 H


 


Lymph % (Auto)    7.4 L


 


Mono % (Auto)    7.7


 


Eos % (Auto)    0.2


 


Baso % (Auto)    0.3


 


Neut # (Auto)    12.9 H


 


Lymph # (Auto)    1.1


 


Mono # (Auto)    1.2 H


 


Eos # (Auto)    0.0


 


Baso # (Auto)    0.0


 


Neutrophils % (Manual)    88 H


 


Band Neutrophils %    2


 


Lymphocytes % (Manual)    7 L


 


Monocytes % (Manual)    3


 


Differential Comment   


 


Platelet Estimate    Normal


 


Polychromasia    Slight


 


Hypochromasia (manual)    Slight


 


Anisocytosis (manual)    Slight


 


Ovalocytes    Slight


 


Retic Count   


 


PT   


 


INR   


 


APTT   


 


Puncture Site   Rb 


 


pCO2   23 L 


 


pO2   267 H 


 


HCO3   6.8 L* 


 


ABG pH   7.03 L* 


 


ABG Total CO2   6.8 L 


 


ABG O2 Saturation   100.6 H 


 


ABG Base Excess   -23.3 L 


 


John Test   Na 


 


ABG Potassium   1.2 L* 


 


A-a O2 Difference   417.0 


 


Respiratory Index   1.6 


 


Glucose   111 H 


 


Lactate   6.0 H* 


 


Vent Mode   Prvc 


 


Mechanical Rate   16 


 


FiO2   100.0 


 


Tidal Volume   500 


 


PEEP   5 


 


Crit Value Called To   T ren do 


 


Crit Value Called By   MOHINI lester rrt 


 


Crit Value Read Back   Y 


 


Blood Gas Notified Time   1200 


 


Sodium   158.0 H 


 


Potassium   


 


Chloride   131.0 H 


 


Carbon Dioxide   


 


Anion Gap   


 


BUN   


 


Creatinine   


 


Est GFR ( Amer)   


 


Est GFR (Non-Af Amer)   


 


POC Glucose (mg/dL)   


 


Random Glucose   


 


Calcium   


 


Phosphorus   


 


Magnesium   


 


Iron   


 


TIBC   


 


% Saturation   


 


Transferrin   


 


Total Bilirubin   


 


AST   


 


ALT   


 


Alkaline Phosphatase   


 


Total Creatine Kinase   


 


CK-MB (Mass)   


 


Troponin I   


 


NT-Pro-B Natriuret Pep   


 


Total Protein   


 


Albumin   


 


Globulin   


 


Albumin/Globulin Ratio   


 


Triglycerides   


 


Cholesterol   


 


LDL Cholesterol Direct   


 


HDL Cholesterol   


 


Vitamin B12   


 


Folate   


 


Arterial Blood Potassium   1.2 L* 


 


Urine Color   


 


Urine Clarity   


 


Urine pH   


 


Ur Specific Gravity   


 


Urine Protein   


 


Urine Glucose (UA)   


 


Urine Ketones   


 


Urine Blood   


 


Urine Nitrate   


 


Urine Bilirubin   


 


Urine Urobilinogen   


 


Ur Leukocyte Esterase   


 


Urine WBC (Auto)   


 


Urine RBC (Auto)   


 


Ur Squamous Epith Cells   


 


Urine Bacteria   


 


Blood Type  A POSITIVE  


 


Antibody Screen  Negative  














  03/25/19 03/25/19 03/25/19





  14:44 14:44 16:40


 


WBC   


 


RBC   


 


Hgb   


 


Hct   


 


MCV   


 


MCH   


 


MCHC   


 


RDW   


 


Plt Count   


 


MPV   


 


Neut % (Auto)   


 


Lymph % (Auto)   


 


Mono % (Auto)   


 


Eos % (Auto)   


 


Baso % (Auto)   


 


Neut # (Auto)   


 


Lymph # (Auto)   


 


Mono # (Auto)   


 


Eos # (Auto)   


 


Baso # (Auto)   


 


Neutrophils % (Manual)   


 


Band Neutrophils %   


 


Lymphocytes % (Manual)   


 


Monocytes % (Manual)   


 


Differential Comment   


 


Platelet Estimate   


 


Polychromasia   


 


Hypochromasia (manual)   


 


Anisocytosis (manual)   


 


Ovalocytes   


 


Retic Count   


 


PT   


 


INR   


 


APTT   


 


Puncture Site   


 


pCO2   


 


pO2   


 


HCO3   


 


ABG pH   


 


ABG Total CO2   


 


ABG O2 Saturation   


 


ABG Base Excess   


 


John Test   


 


ABG Potassium   


 


A-a O2 Difference   


 


Respiratory Index   


 


Glucose   


 


Lactate   


 


Vent Mode   


 


Mechanical Rate   


 


FiO2   


 


Tidal Volume   


 


PEEP   


 


Crit Value Called To   


 


Crit Value Called By   


 


Crit Value Read Back   


 


Blood Gas Notified Time   


 


Sodium  138  


 


Potassium  3.5 L  


 


Chloride  92 L  


 


Carbon Dioxide  25  


 


Anion Gap  24 H  


 


BUN  18  


 


Creatinine  7.9 H*  


 


Est GFR ( Amer)  8  


 


Est GFR (Non-Af Amer)  7  


 


POC Glucose (mg/dL)   


 


Random Glucose  282 H  


 


Calcium  9.4  


 


Phosphorus  4.0  


 


Magnesium  2.4 H  


 


Iron   


 


TIBC   


 


% Saturation   


 


Transferrin   


 


Total Bilirubin  0.7  


 


AST  527 H D  


 


ALT  446 H D  


 


Alkaline Phosphatase  74  


 


Total Creatine Kinase    3962 H


 


CK-MB (Mass)    103 H


 


Troponin I    61.5000 H*


 


NT-Pro-B Natriuret Pep   


 


Total Protein  7.5  


 


Albumin  4.5  


 


Globulin  3.0  


 


Albumin/Globulin Ratio  1.5  


 


Triglycerides   


 


Cholesterol   


 


LDL Cholesterol Direct   


 


HDL Cholesterol   


 


Vitamin B12   


 


Folate   


 


Arterial Blood Potassium   


 


Urine Color   Yellow 


 


Urine Clarity   Hazy 


 


Urine pH   5.0 


 


Ur Specific Gravity   1.023 


 


Urine Protein   1+ H 


 


Urine Glucose (UA)   Normal 


 


Urine Ketones   Negative 


 


Urine Blood   Negative 


 


Urine Nitrate   Negative 


 


Urine Bilirubin   Negative 


 


Urine Urobilinogen   Normal 


 


Ur Leukocyte Esterase   Neg 


 


Urine WBC (Auto)   2 


 


Urine RBC (Auto)   3 


 


Ur Squamous Epith Cells   < 1 


 


Urine Bacteria   Rare 


 


Blood Type   


 


Antibody Screen   














  03/25/19 03/25/19 03/25/19





  19:30 22:15 22:15


 


WBC   20.8 H 


 


RBC   3.36 L 


 


Hgb   9.5 L 


 


Hct   31.0 L 


 


MCV   92.4  D 


 


MCH   28.2 


 


MCHC   30.6 L 


 


RDW   17.0 H 


 


Plt Count   181 


 


MPV   9.2 


 


Neut % (Auto)   91.2 H 


 


Lymph % (Auto)   3.8 L 


 


Mono % (Auto)   4.8 


 


Eos % (Auto)   0.1 


 


Baso % (Auto)   0.1 


 


Neut # (Auto)   18.9 H 


 


Lymph # (Auto)   0.8 L 


 


Mono # (Auto)   1.0 H 


 


Eos # (Auto)   0.0 


 


Baso # (Auto)   0.0 


 


Neutrophils % (Manual)   88 H 


 


Band Neutrophils %   5 H 


 


Lymphocytes % (Manual)   2 L 


 


Monocytes % (Manual)   5 


 


Differential Comment   


 


Platelet Estimate   Normal 


 


Polychromasia   


 


Hypochromasia (manual)   


 


Anisocytosis (manual)   


 


Ovalocytes   


 


Retic Count   


 


PT    13.7 H


 


INR    1.3


 


APTT    40 H D


 


Puncture Site  Rba  


 


pCO2  43  


 


pO2  285 H  


 


HCO3  15.3 L  


 


ABG pH  7.17 L*  


 


ABG Total CO2  17.0 L  


 


ABG O2 Saturation  99.5 H  


 


ABG Base Excess  -12.4 L  


 


John Test  Pos  


 


ABG Potassium  3.7  


 


A-a O2 Difference  374.0  


 


Respiratory Index  1.3  


 


Glucose  247 H  


 


Lactate  15.6 H*  


 


Vent Mode  Prvc  


 


Mechanical Rate  20  


 


FiO2  100.0  


 


Tidal Volume  500  


 


PEEP  5  


 


Crit Value Called To  Dr quan  


 


Crit Value Called By  [facility]  


 


Crit Value Read Back  Y  


 


Blood Gas Notified Time  1935  


 


Sodium  142.0  


 


Potassium   


 


Chloride  100.0  


 


Carbon Dioxide   


 


Anion Gap   


 


BUN   


 


Creatinine   


 


Est GFR ( Amer)   


 


Est GFR (Non-Af Amer)   


 


POC Glucose (mg/dL)   


 


Random Glucose   


 


Calcium   


 


Phosphorus   


 


Magnesium   


 


Iron   


 


TIBC   


 


% Saturation   


 


Transferrin   


 


Total Bilirubin   


 


AST   


 


ALT   


 


Alkaline Phosphatase   


 


Total Creatine Kinase   


 


CK-MB (Mass)   


 


Troponin I   


 


NT-Pro-B Natriuret Pep   


 


Total Protein   


 


Albumin   


 


Globulin   


 


Albumin/Globulin Ratio   


 


Triglycerides   


 


Cholesterol   


 


LDL Cholesterol Direct   


 


HDL Cholesterol   


 


Vitamin B12   


 


Folate   


 


Arterial Blood Potassium  3.7  


 


Urine Color   


 


Urine Clarity   


 


Urine pH   


 


Ur Specific Gravity   


 


Urine Protein   


 


Urine Glucose (UA)   


 


Urine Ketones   


 


Urine Blood   


 


Urine Nitrate   


 


Urine Bilirubin   


 


Urine Urobilinogen   


 


Ur Leukocyte Esterase   


 


Urine WBC (Auto)   


 


Urine RBC (Auto)   


 


Ur Squamous Epith Cells   


 


Urine Bacteria   


 


Blood Type   


 


Antibody Screen   














  03/25/19 03/25/19 03/26/19





  22:15 22:15 00:04


 


WBC   


 


RBC   


 


Hgb   


 


Hct   


 


MCV   


 


MCH   


 


MCHC   


 


RDW   


 


Plt Count   


 


MPV   


 


Neut % (Auto)   


 


Lymph % (Auto)   


 


Mono % (Auto)   


 


Eos % (Auto)   


 


Baso % (Auto)   


 


Neut # (Auto)   


 


Lymph # (Auto)   


 


Mono # (Auto)   


 


Eos # (Auto)   


 


Baso # (Auto)   


 


Neutrophils % (Manual)   


 


Band Neutrophils %   


 


Lymphocytes % (Manual)   


 


Monocytes % (Manual)   


 


Differential Comment   


 


Platelet Estimate   


 


Polychromasia   


 


Hypochromasia (manual)   


 


Anisocytosis (manual)   


 


Ovalocytes   


 


Retic Count   


 


PT   


 


INR   


 


APTT   


 


Puncture Site   


 


pCO2   


 


pO2   


 


HCO3   


 


ABG pH   


 


ABG Total CO2   


 


ABG O2 Saturation   


 


ABG Base Excess   


 


John Test   


 


ABG Potassium   


 


A-a O2 Difference   


 


Respiratory Index   


 


Glucose   


 


Lactate   


 


Vent Mode   


 


Mechanical Rate   


 


FiO2   


 


Tidal Volume   


 


PEEP   


 


Crit Value Called To   


 


Crit Value Called By   


 


Crit Value Read Back   


 


Blood Gas Notified Time   


 


Sodium  140  


 


Potassium  3.4 L  


 


Chloride  93 L  


 


Carbon Dioxide  19 L  


 


Anion Gap  30 H  


 


BUN  21 H  


 


Creatinine  8.7 H*  


 


Est GFR ( Amer)  8  


 


Est GFR (Non-Af Amer)  6  


 


POC Glucose (mg/dL)    260 H


 


Random Glucose  240 H  


 


Calcium  9.1  


 


Phosphorus  6.9 H  


 


Magnesium  2.6 H  


 


Iron   


 


TIBC   


 


% Saturation   


 


Transferrin   


 


Total Bilirubin   


 


AST   


 


ALT   


 


Alkaline Phosphatase   


 


Total Creatine Kinase   5933 H 


 


CK-MB (Mass)   168 H 


 


Troponin I   107.0000 H* 


 


NT-Pro-B Natriuret Pep   


 


Total Protein   


 


Albumin   


 


Globulin   


 


Albumin/Globulin Ratio   


 


Triglycerides   


 


Cholesterol   


 


LDL Cholesterol Direct   


 


HDL Cholesterol   


 


Vitamin B12   


 


Folate   


 


Arterial Blood Potassium   


 


Urine Color   


 


Urine Clarity   


 


Urine pH   


 


Ur Specific Gravity   


 


Urine Protein   


 


Urine Glucose (UA)   


 


Urine Ketones   


 


Urine Blood   


 


Urine Nitrate   


 


Urine Bilirubin   


 


Urine Urobilinogen   


 


Ur Leukocyte Esterase   


 


Urine WBC (Auto)   


 


Urine RBC (Auto)   


 


Ur Squamous Epith Cells   


 


Urine Bacteria   


 


Blood Type   


 


Antibody Screen   














  03/26/19 03/26/19 03/26/19





  01:13 02:20 03:47


 


WBC   


 


RBC   


 


Hgb   


 


Hct   


 


MCV   


 


MCH   


 


MCHC   


 


RDW   


 


Plt Count   


 


MPV   


 


Neut % (Auto)   


 


Lymph % (Auto)   


 


Mono % (Auto)   


 


Eos % (Auto)   


 


Baso % (Auto)   


 


Neut # (Auto)   


 


Lymph # (Auto)   


 


Mono # (Auto)   


 


Eos # (Auto)   


 


Baso # (Auto)   


 


Neutrophils % (Manual)   


 


Band Neutrophils %   


 


Lymphocytes % (Manual)   


 


Monocytes % (Manual)   


 


Differential Comment   


 


Platelet Estimate   


 


Polychromasia   


 


Hypochromasia (manual)   


 


Anisocytosis (manual)   


 


Ovalocytes   


 


Retic Count   


 


PT   


 


INR   


 


APTT   


 


Puncture Site   


 


pCO2   


 


pO2   


 


HCO3   


 


ABG pH   


 


ABG Total CO2   


 


ABG O2 Saturation   


 


ABG Base Excess   


 


John Test   


 


ABG Potassium   


 


A-a O2 Difference   


 


Respiratory Index   


 


Glucose   


 


Lactate   


 


Vent Mode   


 


Mechanical Rate   


 


FiO2   


 


Tidal Volume   


 


PEEP   


 


Crit Value Called To   


 


Crit Value Called By   


 


Crit Value Read Back   


 


Blood Gas Notified Time   


 


Sodium   


 


Potassium   


 


Chloride   


 


Carbon Dioxide   


 


Anion Gap   


 


BUN   


 


Creatinine   


 


Est GFR ( Amer)   


 


Est GFR (Non-Af Amer)   


 


POC Glucose (mg/dL)  253 H  231 H  199 H


 


Random Glucose   


 


Calcium   


 


Phosphorus   


 


Magnesium   


 


Iron   


 


TIBC   


 


% Saturation   


 


Transferrin   


 


Total Bilirubin   


 


AST   


 


ALT   


 


Alkaline Phosphatase   


 


Total Creatine Kinase   


 


CK-MB (Mass)   


 


Troponin I   


 


NT-Pro-B Natriuret Pep   


 


Total Protein   


 


Albumin   


 


Globulin   


 


Albumin/Globulin Ratio   


 


Triglycerides   


 


Cholesterol   


 


LDL Cholesterol Direct   


 


HDL Cholesterol   


 


Vitamin B12   


 


Folate   


 


Arterial Blood Potassium   


 


Urine Color   


 


Urine Clarity   


 


Urine pH   


 


Ur Specific Gravity   


 


Urine Protein   


 


Urine Glucose (UA)   


 


Urine Ketones   


 


Urine Blood   


 


Urine Nitrate   


 


Urine Bilirubin   


 


Urine Urobilinogen   


 


Ur Leukocyte Esterase   


 


Urine WBC (Auto)   


 


Urine RBC (Auto)   


 


Ur Squamous Epith Cells   


 


Urine Bacteria   


 


Blood Type   


 


Antibody Screen   














  03/26/19 03/26/19 03/26/19





  04:40 05:13 05:50


 


WBC   


 


RBC   


 


Hgb   


 


Hct   


 


MCV   


 


MCH   


 


MCHC   


 


RDW   


 


Plt Count   


 


MPV   


 


Neut % (Auto)   


 


Lymph % (Auto)   


 


Mono % (Auto)   


 


Eos % (Auto)   


 


Baso % (Auto)   


 


Neut # (Auto)   


 


Lymph # (Auto)   


 


Mono # (Auto)   


 


Eos # (Auto)   


 


Baso # (Auto)   


 


Neutrophils % (Manual)   


 


Band Neutrophils %   


 


Lymphocytes % (Manual)   


 


Monocytes % (Manual)   


 


Differential Comment   


 


Platelet Estimate   


 


Polychromasia   


 


Hypochromasia (manual)   


 


Anisocytosis (manual)   


 


Ovalocytes   


 


Retic Count   


 


PT   


 


INR   


 


APTT   


 


Puncture Site   R brac 


 


pCO2   36 


 


pO2   290 H 


 


HCO3   14.8 L 


 


ABG pH   7.20 L 


 


ABG Total CO2   15.2 L 


 


ABG O2 Saturation   99.7 H 


 


ABG Base Excess   -13.1 L 


 


John Test   Na 


 


ABG Potassium   4.1 


 


A-a O2 Difference   164.0 


 


Respiratory Index   0.6 


 


Glucose   131 H 


 


Lactate   17.6 H* 


 


Vent Mode   Prvc 


 


Mechanical Rate   24 


 


FiO2   70.0 


 


Tidal Volume   500 


 


PEEP   5 


 


Crit Value Called To   Tamara icu rn 


 


Crit Value Called By   Pily peralta rt 


 


Crit Value Read Back   Y 


 


Blood Gas Notified Time   615 


 


Sodium   141.0  140


 


Potassium    3.5 L


 


Chloride   99.0  91 L


 


Carbon Dioxide    27


 


Anion Gap    25 H


 


BUN    26 H


 


Creatinine    8.9 H*


 


Est GFR ( Amer)    7


 


Est GFR (Non-Af Amer)    6


 


POC Glucose (mg/dL)  151 H  


 


Random Glucose    134 H D


 


Calcium    8.8


 


Phosphorus    5.7 H


 


Magnesium    2.4 H


 


Iron   


 


TIBC   


 


% Saturation   


 


Transferrin   


 


Total Bilirubin    0.5


 


AST    590 H


 


ALT    377 H


 


Alkaline Phosphatase    64


 


Total Creatine Kinase   


 


CK-MB (Mass)   


 


Troponin I   


 


NT-Pro-B Natriuret Pep   


 


Total Protein    7.3


 


Albumin    4.4


 


Globulin    2.9


 


Albumin/Globulin Ratio    1.5


 


Triglycerides    274 H


 


Cholesterol    142


 


LDL Cholesterol Direct    63


 


HDL Cholesterol    23 L


 


Vitamin B12    781


 


Folate    6.3


 


Arterial Blood Potassium   4.1 


 


Urine Color   


 


Urine Clarity   


 


Urine pH   


 


Ur Specific Gravity   


 


Urine Protein   


 


Urine Glucose (UA)   


 


Urine Ketones   


 


Urine Blood   


 


Urine Nitrate   


 


Urine Bilirubin   


 


Urine Urobilinogen   


 


Ur Leukocyte Esterase   


 


Urine WBC (Auto)   


 


Urine RBC (Auto)   


 


Ur Squamous Epith Cells   


 


Urine Bacteria   


 


Blood Type   


 


Antibody Screen   














  03/26/19 03/26/19 03/26/19





  05:50 05:50 05:51


 


WBC    19.6 H


 


RBC    3.42 L


 


Hgb    9.6 L


 


Hct    30.7 L


 


MCV    89.9  D


 


MCH    28.1


 


MCHC    31.2 L


 


RDW    17.0 H


 


Plt Count    183


 


MPV    9.0


 


Neut % (Auto)    87.7 H


 


Lymph % (Auto)    7.2 L


 


Mono % (Auto)    5.0


 


Eos % (Auto)    0.0


 


Baso % (Auto)    0.1


 


Neut # (Auto)    17.2 H


 


Lymph # (Auto)    1.4


 


Mono # (Auto)    1.0 H


 


Eos # (Auto)    0.0


 


Baso # (Auto)    0.0


 


Neutrophils % (Manual)   


 


Band Neutrophils %   


 


Lymphocytes % (Manual)   


 


Monocytes % (Manual)   


 


Differential Comment   


 


Platelet Estimate   


 


Polychromasia   


 


Hypochromasia (manual)   


 


Anisocytosis (manual)   


 


Ovalocytes   


 


Retic Count   


 


PT   


 


INR   


 


APTT   


 


Puncture Site   


 


pCO2   


 


pO2   


 


HCO3   


 


ABG pH   


 


ABG Total CO2   


 


ABG O2 Saturation   


 


ABG Base Excess   


 


John Test   


 


ABG Potassium   


 


A-a O2 Difference   


 


Respiratory Index   


 


Glucose   


 


Lactate   


 


Vent Mode   


 


Mechanical Rate   


 


FiO2   


 


Tidal Volume   


 


PEEP   


 


Crit Value Called To   


 


Crit Value Called By   


 


Crit Value Read Back   


 


Blood Gas Notified Time   


 


Sodium   


 


Potassium   


 


Chloride   


 


Carbon Dioxide   


 


Anion Gap   


 


BUN   


 


Creatinine   


 


Est GFR ( Amer)   


 


Est GFR (Non-Af Amer)   


 


POC Glucose (mg/dL)   


 


Random Glucose   


 


Calcium   


 


Phosphorus   


 


Magnesium   


 


Iron  46 L  


 


TIBC  210 L  


 


% Saturation  22  


 


Transferrin   150.39 L 


 


Total Bilirubin   


 


AST   


 


ALT   


 


Alkaline Phosphatase   


 


Total Creatine Kinase   


 


CK-MB (Mass)   


 


Troponin I   


 


NT-Pro-B Natriuret Pep   


 


Total Protein   


 


Albumin   


 


Globulin   


 


Albumin/Globulin Ratio   


 


Triglycerides   


 


Cholesterol   


 


LDL Cholesterol Direct   


 


HDL Cholesterol   


 


Vitamin B12   


 


Folate   


 


Arterial Blood Potassium   


 


Urine Color   


 


Urine Clarity   


 


Urine pH   


 


Ur Specific Gravity   


 


Urine Protein   


 


Urine Glucose (UA)   


 


Urine Ketones   


 


Urine Blood   


 


Urine Nitrate   


 


Urine Bilirubin   


 


Urine Urobilinogen   


 


Ur Leukocyte Esterase   


 


Urine WBC (Auto)   


 


Urine RBC (Auto)   


 


Ur Squamous Epith Cells   


 


Urine Bacteria   


 


Blood Type   


 


Antibody Screen   














  03/26/19 03/26/19





  05:51 05:51


 


WBC  


 


RBC  


 


Hgb  


 


Hct  


 


MCV  


 


MCH  


 


MCHC  


 


RDW  


 


Plt Count  


 


MPV  


 


Neut % (Auto)  


 


Lymph % (Auto)  


 


Mono % (Auto)  


 


Eos % (Auto)  


 


Baso % (Auto)  


 


Neut # (Auto)  


 


Lymph # (Auto)  


 


Mono # (Auto)  


 


Eos # (Auto)  


 


Baso # (Auto)  


 


Neutrophils % (Manual)  


 


Band Neutrophils %  


 


Lymphocytes % (Manual)  


 


Monocytes % (Manual)  


 


Differential Comment  


 


Platelet Estimate  


 


Polychromasia  


 


Hypochromasia (manual)  


 


Anisocytosis (manual)  


 


Ovalocytes  


 


Retic Count   1.8 H


 


PT  


 


INR  


 


APTT  41 H 


 


Puncture Site  


 


pCO2  


 


pO2  


 


HCO3  


 


ABG pH  


 


ABG Total CO2  


 


ABG O2 Saturation  


 


ABG Base Excess  


 


John Test  


 


ABG Potassium  


 


A-a O2 Difference  


 


Respiratory Index  


 


Glucose  


 


Lactate  


 


Vent Mode  


 


Mechanical Rate  


 


FiO2  


 


Tidal Volume  


 


PEEP  


 


Crit Value Called To  


 


Crit Value Called By  


 


Crit Value Read Back  


 


Blood Gas Notified Time  


 


Sodium  


 


Potassium  


 


Chloride  


 


Carbon Dioxide  


 


Anion Gap  


 


BUN  


 


Creatinine  


 


Est GFR ( Amer)  


 


Est GFR (Non-Af Amer)  


 


POC Glucose (mg/dL)  


 


Random Glucose  


 


Calcium  


 


Phosphorus  


 


Magnesium  


 


Iron  


 


TIBC  


 


% Saturation  


 


Transferrin  


 


Total Bilirubin  


 


AST  


 


ALT  


 


Alkaline Phosphatase  


 


Total Creatine Kinase  


 


CK-MB (Mass)  


 


Troponin I  


 


NT-Pro-B Natriuret Pep  


 


Total Protein  


 


Albumin  


 


Globulin  


 


Albumin/Globulin Ratio  


 


Triglycerides  


 


Cholesterol  


 


LDL Cholesterol Direct  


 


HDL Cholesterol  


 


Vitamin B12  


 


Folate  


 


Arterial Blood Potassium  


 


Urine Color  


 


Urine Clarity  


 


Urine pH  


 


Ur Specific Gravity  


 


Urine Protein  


 


Urine Glucose (UA)  


 


Urine Ketones  


 


Urine Blood  


 


Urine Nitrate  


 


Urine Bilirubin  


 


Urine Urobilinogen  


 


Ur Leukocyte Esterase  


 


Urine WBC (Auto)  


 


Urine RBC (Auto)  


 


Ur Squamous Epith Cells  


 


Urine Bacteria  


 


Blood Type  


 


Antibody Screen  











Radiology Impressions: 


                              Radiology Impressions





Chest X-Ray  03/25/19 10:32


IMPRESSION:


Endotracheal tube terminates 2.3 cm proximal to the wesley.  


Nasogastric tube terminates in the stomach.


 


Severe cardiomegaly and mild pulmonary venous congestion with 


redistribution.


 


 








Head CT  03/25/19 12:22


IMPRESSION:


Mild-moderate diffuse and confluent chronic periventricular white 


matter ischemic changes seen extending peripherally into the deep and 


subcortical white matter both cerebral hemispheres.  Additionally, 


there also appears to be scattered chronic bilateral basal nuclei 


lacunar type infarcts.  Note possibility of a small hyperacute 


infarct cannot be excluded based on this exam.


 


Mild to moderate generalized volume loss. 


 


Mucoperiosteal inflammatory changes seen within all the paranasal 


sinuses. 


 


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 


 


 











EKG/Cardiology Studies: 


Cardiology / EKG Studies





03/25/19 10:32


ELECTROCARDIOGRAM Stat 


   Comment: 


   Mode Of Transportation: BED


   Reason For Exam: chest pain











Fingerstick Blood Sugar Results: 139





Review of Systems





- Review of Systems


Systems not reviewed;Unavailable: Intubated





Critical Care Progress Note





- Nutrition


Nutrition: 


                                    Nutrition











 Category Date Time Status


 


 NPO Diet [DIET] Diets  03/25/19 Dinner Active














Assessment/Plan





- Assessment and Plan (Free Text)


Assessment: 


63 y o male with PMhx HIV (dx in 2005, currently on HAART tx), DM, HTN, HLD, CAD

 s/p 3 stents, and ESRD on HD MWF, who presented to the ED [facility] s/p cardiac 

arrest at dialysis (completed 3 hrs of tx). Reason for ICU consult was for s/p 

cardiac arrest x3. Currently intubated in ICU, s/p cardiac arrest this am in 

unit with ROSC achieved. Palliative care consulted. Pt's  at bedside 

consented to DNR status at this time. 


Plan: 


Neuro:


-Intubated, unable to assess at this time


-Cont to monitor


-CT head on admission: Mild-moderate diffuse and conflient chronic 

periventricular white matter ischemic changes seen extending peripherally into 

the deep and subcortical white matter both cerebral hemispheres. Additionally, 

there also appears to be scattered chronic b/l basal nuclei lacunar type 

infarcts. Note possibility of a small hyperacute infarct cannot be excluded 

based on this exam. Mild to moderate generalized volume loss. Mucoperiosteal 

inflammatory changes seen within all the paranasal sinuses. Fusion anomaly 

anterior arch C1.





Cardio:


-S/p cardiac arrest x3 as noted above in detail


-Hypothermic protocol, rewarming this am


-Tylenol, Meperidine prn for chills


-Hx HTN, HLD, CAD s/p 3 stents


-Troponins elevated and trending up


-BNP elevated on admission


-Cardiology consulted, Dr. Zamorano, recs appreciated


-Echo pending


-ASA, Plavix


-Lasix bid, monitor K


-Zetia


-Metoprolol, Procardia XL


-Hold home bp meds for hypotension


-Crestor


-A1c 6.2, lipid panel demonstrates elevated TGs and low HDL





Pulm:


-Currently on ventilator s/p intubation 2/2 cardiac arrest


-Metabolic acidosis present on ABG on admission: 7.03/23/267/6.8


-Repeat ABG this am s/p cardiac arrest: 7.01/39/260/8.8


-Duonebs q4h


-Cont to monitor


-Maintain O2 sat > 92%





GI:


-NPO


-Protonix


-No acute issues at this time





Renal:


-Hx ESRD on HD MWF


-Nephrology consulted (Dr. Augustine), recs appreciated


-BUN/Cr 26/8.9


-Cont to trend I's/O's


-Pt unstable for HD at this time, will cont to monitor and reassess 





ID:


-Hx HIV on HAART rx, home meds restarted


-ID consulted, Dr. Arce, recs appreciated


-Leukocytosis


-On hypothermic protocol s/p cardiac arrest, rewarming initiated this am


-No anbx at this time





Heme:


-H/H demonstrates anemia, may be 2/2 HIV diagnosis


-Low iron studies, B12 and folate wnl, retic ct elevated


-Thrombocytopenia resolved, cont to trend


-L-shift present





PPX:


-Protonix, Heparin


-Currently DNR status


-Palliative care consulted, recs appreciated








Pt seen, examined with, and plan discussed with Dr. Hines, attending physician.





Miguelangel Salinas, DO PGY-1, Family Medicine Resident


Pager #[phone]

## 2019-03-26 NOTE — CP.PCM.PN
<Miguelangel Salinas - Last Filed: 03/26/19 10:20>





Subjective





- Date & Time of Evaluation


Date of Evaluation: 03/26/19


Time of Evaluation: 10:20





- Subjective


Subjective: 


Code Blue was called at 9:57 for pulselessness. Pt currently intubated. CPR was 

initiated. Total of 1x atropine and 1x epinephrine were given during 

resuscitation. ROSC was achieved at 10:00. Rhythm changed from asystole to sinus

rhythm. Pt placed on epi drip. Stat ABG ordered. Dr. Hines, attending physician, 

present at bedside for duration of Code Blue. Updated pt's  at bedside 

with current clinical status. Palliative care consult placed, recs appreciated. 

Pt currently Full Code status. 





Objective





- Vital Signs/Intake and Output


Vital Signs (last 24 hours): 


                                        











Temp Pulse Resp BP Pulse Ox


 


 91.6 F L  79   14   99/28 L  90 L


 


 03/26/19 08:00  03/26/19 09:00  03/26/19 09:00  03/26/19 09:48  03/26/19 08:29








Intake and Output: 


                                        











 03/26/19 03/26/19





 06:59 18:59


 


Intake Total 2286.1 1836.4


 


Output Total 400 0


 


Balance 1886.1 1836.4














- Medications


Medications: 


                               Current Medications





Albuterol/Ipratropium (Duoneb 3 Mg/0.5 Mg (3 Ml) Ud)  3 ml INH RQ4 [facility]


   Last Admin: 03/26/19 07:44 Dose:  3 ml


Artificial Tears (Lacri-Lube)  1 gm OU Q4H [facility]


Calcium Acetate (Phoslo)  667 mg PO DAILY [facility]


   Last Admin: 03/26/19 09:13 Dose:  Not Given


Ezetimibe (Zetia)  10 mg PO HS [facility]


   Last Admin: 03/25/19 21:44 Dose:  Not Given


Famotidine (Pepcid)  20 mg PO DAILY [facility]


   Last Admin: 03/26/19 09:13 Dose:  Not Given


Gabapentin (Neurontin)  100 mg PO HS [facility]


   Last Admin: 03/25/19 21:44 Dose:  Not Given


Heparin Sodium (Porcine) (Heparin)  5,000 units SC Q8 [facility]


   Last Admin: 03/26/19 05:48 Dose:  5,000 units


Home Med (Patient's Own Medication)  1 tab PO DAILY [facility]


Norepinephrine Bitartrate 4 mg (/ Sodium Chloride)  254 mls @ 15.24 mls/hr IV 

.[id number] PRN; Protocol


   PRN Reason: TITRATE PER MD ORDER


   Last Admin: 03/26/19 09:12 Dose:  10 mcg/min, 38.1 mls/hr


Dopamine HCl/Dextrose (Dopamine 400mg/250ml D5w)  400 mg in 250 mls @ 7.032 

mls/hr IV .Q24H PRN; Protocol


   PRN Reason: TITRATE PER MD ORDER


   Last Admin: 03/26/19 08:50 Dose:  20 mcg/kg/min, 70.319 mls/hr


Vasopressin 40 units/ Dextrose  42 mls @ 0.63 mls/hr IV .Q24H [facility]; Protocol


   Last Admin: 03/26/19 09:48 Dose:  0.01 units/min, 0.63 mls/hr


Sodium Bicarbonate 150 meq/ (Dextrose)  1,150 mls @ 60 mls/hr IV .[id number] [facility]


   Last Admin: 03/26/19 08:15 Dose:  60 mls/hr


Epinephrine HCl 1 mg/ Sodium (Chloride)  251 mls @ 15.06 mls/hr IV .[id number] PRN;

Protocol


   PRN Reason: TITRATE PER MD ORDER


   Last Admin: 03/26/19 10:14 Dose:  3 mcg/min, 45.18 mls/hr


Vancomycin HCl (Vancomycin 1gm In Normal Saline Addvantage)  1 gm in 250 mls @ 

167 mls/hr IVPB ONCE ONE; Protocol


   Stop: 03/26/19 11:59


Gentamicin Sulfate 80 mg/ (Sodium Chloride)  102 mls @ 100 mls/hr IVPB ONCE ONE;

Protocol


   Stop: 03/26/19 12:01


Cefepime HCl (Maxipime Iv 1 Gm Premix)  1 gm in 50 mls @ 100 mls/hr IVPB Q24H 

[facility]; Protocol


Insulin Human Regular (Novolin R)  0 unit SC Q1H [facility]; Protocol


   Last Admin: 03/26/19 08:16 Dose:  Not Given


Meperidine HCl (Demerol)  25 mg IVP Q30M PRN


   PRN Reason: Rigors


Potassium Chloride (Potassium Chloride Oral Soln)  20 meq PO DAILY [facility]


   Last Admin: 03/26/19 09:14 Dose:  Not Given


Raltegravir (Isentress)  400 mg PO Q12H [facility]; Protocol











- Labs


Labs: 


                                        





                                 03/26/19 05:51 





                                 03/26/19 05:50 





                                        











PT  13.7 SECONDS (9.7-12.2)  H  03/25/19  22:15    


 


INR  1.3   03/25/19  22:15    


 


APTT  41 SECONDS (21-34)  H  03/26/19  05:51    














<Satya Hines - Last Filed: 03/26/19 17:37>





Objective





- Vital Signs/Intake and Output


Vital Signs (last 24 hours): 


                                        











Temp Pulse Resp BP Pulse Ox


 


 91.6 F L  84   24   73/51 L  19 L


 


 03/26/19 08:00  03/26/19 16:34  03/26/19 16:34  03/26/19 16:42  03/26/19 16:34








Intake and Output: 


                                        











 03/26/19 03/26/19





 06:59 18:59


 


Intake Total 2286.1 4352.7


 


Output Total 400 0


 


Balance 1886.1 4352.7














- Medications


Medications: 


                               Current Medications





Albuterol/Ipratropium (Duoneb 3 Mg/0.5 Mg (3 Ml) Ud)  3 ml INH RQ4 [facility]


   Last Admin: 03/26/19 12:53 Dose:  Not Given


Artificial Tears (Lacri-Lube)  1 gm OU Q4H [facility]


   Last Admin: 03/26/19 12:13 Dose:  1 gm


Calcium Acetate (Phoslo)  667 mg PO DAILY [facility]


   Last Admin: 03/26/19 09:13 Dose:  Not Given


Ezetimibe (Zetia)  10 mg PO HS [facility]


   Last Admin: 03/25/19 21:44 Dose:  Not Given


Famotidine (Pepcid)  20 mg PO DAILY [facility]


   Last Admin: 03/26/19 09:13 Dose:  Not Given


Gabapentin (Neurontin)  100 mg PO HS [facility]


   Last Admin: 03/25/19 21:44 Dose:  Not Given


Heparin Sodium (Porcine) (Heparin)  5,000 units SC Q8 [facility]


   Last Admin: 03/26/19 16:45 Dose:  Not Given


Home Med (Patient's Own Medication)  1 tab PO DAILY [facility]


Norepinephrine Bitartrate 4 mg (/ Sodium Chloride)  254 mls @ 15.24 mls/hr IV 

.[id number] PRN; Protocol


   PRN Reason: TITRATE PER MD ORDER


   Last Admin: 03/26/19 14:16 Dose:  10 mcg/min, 38.1 mls/hr


Dopamine HCl/Dextrose (Dopamine 400mg/250ml D5w)  400 mg in 250 mls @ 7.032 

mls/hr IV .Q24H PRN; Protocol


   PRN Reason: TITRATE PER MD ORDER


   Last Admin: 03/26/19 16:42 Dose:  20 mcg/kg/min, 70.319 mls/hr


Vasopressin 40 units/ Dextrose  42 mls @ 0.63 mls/hr IV .Q24H [facility]; Protocol


   Last Admin: 03/26/19 09:48 Dose:  0.01 units/min, 0.63 mls/hr


Sodium Bicarbonate 150 meq/ (Dextrose)  1,150 mls @ 60 mls/hr IV .[id number] [facility]


   Last Admin: 03/26/19 08:15 Dose:  60 mls/hr


Epinephrine HCl 1 mg/ Sodium (Chloride)  251 mls @ 15.06 mls/hr IV .[id number] PRN;

Protocol


   PRN Reason: TITRATE PER MD ORDER


   Last Titration: 03/26/19 11:00 Dose:  1 mcg/min, 15.06 mls/hr


Cefepime HCl (Maxipime Iv 1 Gm Premix)  1 gm in 50 mls @ 100 mls/hr IVPB Q24H [facility]

; Protocol


   Last Admin: 03/26/19 12:12 Dose:  100 mls/hr


Insulin Human Regular (Novolin R)  0 unit SC Q1H [facility]; Protocol


   Last Admin: 03/26/19 12:14 Dose:  Not Given


Meperidine HCl (Demerol)  25 mg IVP Q30M PRN


   PRN Reason: Rigors


Potassium Chloride (Potassium Chloride Oral Soln)  20 meq PO DAILY [facility]


   Last Admin: 03/26/19 09:14 Dose:  Not Given


Raltegravir (Isentress)  400 mg PO Q12H [facility]; Protocol











- Labs


Labs: 


                                        





                                 03/26/19 05:51 





                                 03/26/19 05:50 





                                        











PT  13.7 SECONDS (9.7-12.2)  H  03/25/19  22:15    


 


INR  1.3   03/25/19  22:15    


 


APTT  41 SECONDS (21-34)  H  03/26/19  05:51    














Attending/Attestation





- Attestation


I have personally seen and examined this patient.: Yes


I have fully participated in the care of the patient.: Yes


I have reviewed all pertinent clinical information, including history, physical 

exam and plan: Yes


Notes (Text): 





03/26/19 17:36


Patient seen and examined in the intensive care unit.


Intubated on ventilatory support with no response to stimuli


Status post cardiac arrest and code freeze


Patient had a brief arrest in the ICU x2 status post resuscitation


On multiple pressors


 signed DNR


Prognosis poor

## 2019-03-26 NOTE — CON
DATE:  03/26/2019



ATTENDING PHYSICIAN:  Dr. Bustillos.



HISTORY OF PRESENT ILLNESS:  Mr. Butler is in a coma and the history is

obtained from the records.  Mr. Butler is a 63-year-old black male with

a history of HIV on HAART treatment, diabetes, hypertension, coronary

artery disease, post 3 stents on maintenance hemodialysis Monday,

Wednesday, Friday.  Apparently after his treatment on 03/25/2019, he was

eating a protein bar, apparently arrested with a questionable choking

episode.  In the field, he apparently had ventricular fibrillation.  He was

taken to the emergency room where he was intubated with CPR continuing.  He

subsequently was placed on a hypothermic blanket and early this morning he

had another asystolic cardiac arrest.  On 03/25/2019, his white count was

15,200, his hemoglobin was 10.1, hematocrit 32.1, platelet count 178,000. 

Blood gas showed a pH of 7.17 with a pO2 of 285 and a pCO2 of 43 with a

base excess of -12.4.  Sodium was 135, potassium 3.5, chloride 92, CO2 of

25, BUN 18, creatinine 7.9, glucose 282, calcium 9.4, phosphorus 4,

magnesium 2.4.  Total bili 0.7, AST of 527, ALT of 446, alk phos of 74,

total protein 7.5, albumin 4.5.  His troponin was 61.5 and repeated 107. 

His CPK was 168.  Today, his white count is 19,600 with a hemoglobin of

9.6.  His pH is 7.2, pO2 of 290 with a pCO2 of 36 and a base excess of -13.

Sodium 140, potassium 3.5, glucose 151.



PAST MEDICAL HISTORY:  Please see the above.  His hospitalizations are not

known.



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



MEDICATIONS:  Include albuterol, PhosLo, Zetia, Pepcid, Neurontin, insulin.

He is presently on vasopressin, a bicarbonate drip and Levophed.



FAMILY HISTORY:  Unknown.



SOCIAL HISTORY:  He discontinued cigarette 30 years ago.  There was no

alcohol or drug abuse.



REVIEW OF SYSTEMS:  Unknown other than a cough with white sputum over the

last several days.



PHYSICAL EXAMINATION:

GENERAL:  He was an obese male, intubated and unresponsive.  He was

intubated in no distress.

VITAL SIGNS:  His blood pressure was 159/95.  His last temperature was

91.6, pulse was 76.

LUNGS:  Clear with coarse sounds.

HEART:  The heart sounds were distant and regular.

ABDOMEN:  Soft, not overly distended.

EXTREMITIES:  Access in his left arm revealed no bruit and there was no leg

edema.



IMPRESSION:  End-stage renal disease dialysis dependent, diabetic

nephropathy, cannot rule out the effect of human immunodeficiency virus on

renal function, history of hypertension, coronary artery disease, post

percutaneous transluminal coronary angioplasty and stent, cardiac arrest,

etiology unclear.



RECOMMENDATIONS:  Recommend case to be discussed with the intensivist.  To

obtain consent from family.  We will schedule dialysis with no heparin in

view of marked metabolic acidosis.  We will continue to follow with you.



Thank you for your kind referral.



Sincerely





__________________________________________

Ildefonso Lynch MD





DD:  03/26/2019 8:54:01

DT:  03/26/2019 13:03:36

Job # [id number]

## 2019-03-27 NOTE — HP
HISTORY OF PRESENT ILLNESS:  Mr. Butler was admitted to the hospital

with cardiac arrest.  The patient underwent multiple CPRs _____ of the

alcoholism, admitted to the ICU with hypertension.



PHYSICAL EXAMINATION:

GENERAL:  The patient is comatose, not responded to any dose of chemical

stimuli.

HEENT:  Within normal limit.

NECK:  Supple.

CHEST:  Symmetrical.

HEART:  Regular.

ABDOMEN:  Soft.

EXTREMITIES:  No edema.



ASSESSMENT AND PLAN:  The patient suffers from cardiac arrest and

respiratory failure.  The patient is to get bedrest, supportive care in

intensive care unit.







__________________________________________

Shanda Bustillos MD





DD:  03/26/2019 11:07:41

DT:  03/26/2019 13:12:24

Job # [id number]

## 2019-03-27 NOTE — CARD
--------------- APPROVED REPORT --------------





Date of service: 03/25/2019



EKG Measurement

Heart [id number]

[id number]

[id number]

[id number]



<Conclusion>

Atrial fibrillation with rapid ventricular response

Right bundle branch block

Abnormal ECG